# Patient Record
Sex: MALE | Race: WHITE | NOT HISPANIC OR LATINO | URBAN - METROPOLITAN AREA
[De-identification: names, ages, dates, MRNs, and addresses within clinical notes are randomized per-mention and may not be internally consistent; named-entity substitution may affect disease eponyms.]

---

## 2017-06-28 ENCOUNTER — OFFICE VISIT (OUTPATIENT)
Dept: CARDIOLOGY | Facility: CLINIC | Age: 52
End: 2017-06-28

## 2017-06-28 VITALS
HEIGHT: 73 IN | SYSTOLIC BLOOD PRESSURE: 137 MMHG | BODY MASS INDEX: 29.03 KG/M2 | WEIGHT: 219 LBS | HEART RATE: 68 BPM | DIASTOLIC BLOOD PRESSURE: 98 MMHG

## 2017-06-28 DIAGNOSIS — Z91.89 AT RISK FOR OBSTRUCTIVE SLEEP APNEA: ICD-10-CM

## 2017-06-28 DIAGNOSIS — I10 ESSENTIAL HYPERTENSION: ICD-10-CM

## 2017-06-28 DIAGNOSIS — G47.9 SLEEP DISTURBANCES: ICD-10-CM

## 2017-06-28 DIAGNOSIS — I25.10 CORONARY ARTERY DISEASE INVOLVING NATIVE CORONARY ARTERY OF NATIVE HEART WITHOUT ANGINA PECTORIS: Primary | ICD-10-CM

## 2017-06-28 DIAGNOSIS — Z72.0 TOBACCO USE: ICD-10-CM

## 2017-06-28 DIAGNOSIS — E78.5 HYPERLIPIDEMIA, UNSPECIFIED HYPERLIPIDEMIA TYPE: ICD-10-CM

## 2017-06-28 PROCEDURE — 99213 OFFICE O/P EST LOW 20 MIN: CPT | Performed by: INTERNAL MEDICINE

## 2017-06-28 NOTE — PROGRESS NOTES
Subjective:     Encounter Date:06/28/2017      Patient ID: Robin Whitlock is a 52 y.o. 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    REFERRING PHYSICIAN: Ward Arana MD    Chief Complaint:   Chief Complaint   Patient presents with   • Coronary Artery Disease     Problem List:  1. Chronic ischemic heart disease:  a. Remote apparent non ST elevation myocardial infarction with abnormal cardiac enzymes and diagnostic coronary arteriography with angioplasty and stent deployment x2 - data deficit, [place], December 2010.  b. Reported acceptable stress test - data deficit, August 2011.   c. Recurrent chest pain syndrome with diagnostic coronary arteriography and angioplasty and stent deployment - data deficit, September 2012.   d. Recurrent chest pain syndrome with acceptable Cardiolite GXT (100% PMHR; 89% of predicted exercise capacity) without ischemic EKG changes, marked ectopy, anginal type chest discomfort without myocardial scar/reversible ischemia with preserved LVEF (0.62) with continued medical therapy felt warranted, October 2014.   e. Residual class I symptoms.   2. Chronic hypertension - probable essential.   3. Dyslipidemia.   4. Chronic lower tract obstructive syndrome - probable BPH.  5. Intermittent depression.   6. Chronic tobacco use, discontinued December 2015 and restarted spring 2017.    Allergies   Allergen Reactions   • Sulfa Antibiotics Rash         Current Outpatient Prescriptions:   •  amLODIPine (NORVASC) 2.5 MG tablet, Take 1 tablet by mouth Daily., Disp: 90 tablet, Rfl: 3  •  aspirin 81 MG tablet, Take 81 mg by mouth daily., Disp: , Rfl:   •  cholecalciferol (VITAMIN D3) 1000 UNITS tablet, Take 2,000 Units by mouth daily., Disp: , Rfl:   •  CIALIS 5 MG tablet, TAKE 1 TABLET BY MOUTH DAILY (Patient taking differently: QOD), Disp: 30 tablet, Rfl: 5  •  clopidogrel (PLAVIX) 75 MG tablet, Take 1 tablet by mouth Daily., Disp: 90 tablet, Rfl:  3  •  ezetimibe (ZETIA) 10 MG tablet, Take 1 tablet by mouth Daily., Disp: 90 tablet, Rfl: 3  •  isosorbide mononitrate (IMDUR) 30 MG 24 hr tablet, Take 1 tablet by mouth Daily., Disp: 90 tablet, Rfl: 3  •  metoprolol tartrate (LOPRESSOR) 25 MG tablet, Take 1 tablet by mouth 2 (Two) Times a Day., Disp: 180 tablet, Rfl: 3  •  nitroglycerin (NITROSTAT) 0.4 MG SL tablet, Place 1 tablet under the tongue Every 5 (Five) Minutes As Needed for chest pain. Take no more than 3 doses in 15 minutes., Disp: 25 tablet, Rfl: 2  •  rosuvastatin (CRESTOR) 40 MG tablet, Take 1 tablet by mouth Daily., Disp: 90 tablet, Rfl: 3    History of Present Illness Patient is here for a 6-month followup. He just recently returned from his 3-month work trip to [place].  He says he did not really enjoy it because he does not like being in big cities; he spent a lot of his time in [place], [place] has 4 million people.  He is hoping he does not have to go back to [place] for his job; he still supports them remotely from here.  He is afraid to say that he started smoking again while he was in [place].  He does not really want to take Chantix because he has had friends who had unpleasant side effects to it.  He had actually lost most of the weight before his trip and actually returned from his trip 1 pound heavier than what he was when he left.  He does state that he wakes up frequently at night and is able to go back to sleep, and he is currently wearing a FitBit that measures how well he is sleeping.  He wonders if he is having sleep apnea after talking to some coworkers who have it. He has not had any heart issues; no chest pain, tightness, or pressure.  He does not have any symptoms from a cardiopulmonary perspective with his daily activities.  Patient otherwise denies chest pain, shortness of breath, PND, edema, palpitations, syncope or presyncope at this time.      Review of Systems   Endocrine: Positive for cold intolerance.     "  Obtained and otherwise negative except as outlined in problem list and HPI.    Procedures       Objective:       Vitals:    06/28/17 1317 06/28/17 1319   BP: 137/95 137/98   BP Location: Left arm Left arm   Patient Position: Sitting Standing   Pulse: 62 68   Weight: 219 lb (99.3 kg)    Height: 73\" (185.4 cm)      Body mass index is 28.89 kg/(m^2).   Last weight 238 pounds in December 2016    Physical Exam   Constitutional: He appears well-developed and well-nourished.   HENT:   Head: Normocephalic and atraumatic.   Mouth/Throat: Oropharynx is clear and moist.   Neck: Neck supple. No JVD present. Carotid bruit is not present. No thyromegaly present.   Cardiovascular: Normal rate and regular rhythm.  Exam reveals no gallop, no S3 and no friction rub.    No murmur heard.  Pulses:       Dorsalis pedis pulses are 2+ on the right side, and 2+ on the left side.        Posterior tibial pulses are 2+ on the right side, and 2+ on the left side.   Pulmonary/Chest: Effort normal and breath sounds normal.   Abdominal: Soft. He exhibits no mass. There is no hepatosplenomegaly. There is no tenderness.   Lymphadenopathy:     He has no cervical adenopathy.   Neurological: He is alert.   Skin: Skin is warm, dry and intact.       Lab Review:   Lab Results   Component Value Date    GLUCOSE 111 (H) 09/09/2016    BUN 15 09/09/2016    CREATININE 0.80 09/09/2016    EGFRIFNONA 103 09/09/2016    BCR 18.8 09/09/2016    CO2 33.0 (H) 09/09/2016    CALCIUM 9.8 09/09/2016    ALBUMIN 4.30 09/09/2016    LABIL2 1.5 09/09/2016    AST 53 (H) 09/09/2016    ALT 68 (H) 09/09/2016       Lab Results   Component Value Date    WBC 6.72 09/09/2016    HGB 14.3 09/09/2016    HCT 42.4 09/09/2016    MCV 96.1 09/09/2016     09/09/2016       Lab Results   Component Value Date    HGBA1C 5.20 09/09/2016       Lab Results   Component Value Date    TSH 2.749 09/09/2016       Lab Results   Component Value Date    CHOL 140 09/09/2016    CHOL 148 06/30/2016 "     Lab Results   Component Value Date    TRIG 174 (H) 09/09/2016    TRIG 156 (H) 06/30/2016    TRIG 214 (H) 09/16/2015     Lab Results   Component Value Date    HDL 44 09/09/2016    HDL 42 06/30/2016    HDL 55 09/16/2015           Assessment:     Overall continued acceptable course with no interim cardiopulmonary complaints with good functional status. We will defer additional diagnostic or therapeutic intervention from a cardiac perspective at this time.  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     Diagnosis Plan   1. Coronary artery disease involving native coronary artery of native heart without angina pectoris  He does not appear to have angina pectoris at this time; continue current treatment   2. Hyperlipidemia, unspecified hyperlipidemia type  He will follow up with Dr. Arana in 3 months for reassessment of lipid values   3. Essential hypertension  Controlled   4. Tobacco use  Cessation stressed; he declines Chantix   5.      At risk for obstructive sleep apnea       Plan:         1. Patient to continue current medications and close follow up with the above providers.  2. Overnight sleep oximetry study.  3. Tentative cardiology follow up in 6 months or patient may return sooner PRN.    Partially scribed for Florentino Frank MD by Crystal Vieira, YAKELIN. 6/28/2017  1:22 PM     Transcribed by Cyndie Eubanks for Dr. Florentino Frank at 1:23 PM on 06/28/2017    I, Florentino Frank MD, [facility], personally performed the services described in this documentation as scribed by the above named individual in my presence, and it is both accurate and complete. At 1:40 PM on 06/28/2017

## 2017-09-14 ENCOUNTER — OFFICE VISIT (OUTPATIENT)
Dept: FAMILY MEDICINE CLINIC | Facility: CLINIC | Age: 52
End: 2017-09-14

## 2017-09-14 VITALS
HEIGHT: 73 IN | BODY MASS INDEX: 29.16 KG/M2 | WEIGHT: 220 LBS | DIASTOLIC BLOOD PRESSURE: 82 MMHG | HEART RATE: 58 BPM | OXYGEN SATURATION: 98 % | RESPIRATION RATE: 16 BRPM | SYSTOLIC BLOOD PRESSURE: 124 MMHG

## 2017-09-14 DIAGNOSIS — Z00.00 HEALTHCARE MAINTENANCE: Primary | ICD-10-CM

## 2017-09-14 DIAGNOSIS — G47.33 OSA (OBSTRUCTIVE SLEEP APNEA): ICD-10-CM

## 2017-09-14 DIAGNOSIS — Z87.891 PERSONAL HISTORY OF TOBACCO USE, PRESENTING HAZARDS TO HEALTH: ICD-10-CM

## 2017-09-14 PROBLEM — Z91.89 AT RISK FOR OBSTRUCTIVE SLEEP APNEA: Status: RESOLVED | Noted: 2017-06-28 | Resolved: 2017-09-14

## 2017-09-14 LAB
25(OH)D3 SERPL-MCNC: 36.8 NG/ML
ALBUMIN SERPL-MCNC: 4.3 G/DL (ref 3.2–4.8)
ALBUMIN/GLOB SERPL: 1.5 G/DL (ref 1.5–2.5)
ALP SERPL-CCNC: 83 U/L (ref 25–100)
ALT SERPL W P-5'-P-CCNC: 41 U/L (ref 7–40)
ANION GAP SERPL CALCULATED.3IONS-SCNC: 10 MMOL/L (ref 3–11)
ARTICHOKE IGE QN: 91 MG/DL (ref 0–130)
AST SERPL-CCNC: 35 U/L (ref 0–33)
BASOPHILS # BLD AUTO: 0.08 10*3/MM3 (ref 0–0.2)
BASOPHILS NFR BLD AUTO: 1.2 % (ref 0–1)
BILIRUB BLD-MCNC: NEGATIVE MG/DL
BILIRUB SERPL-MCNC: 0.6 MG/DL (ref 0.3–1.2)
BUN BLD-MCNC: 14 MG/DL (ref 9–23)
BUN/CREAT SERPL: 17.5 (ref 7–25)
CALCIUM SPEC-SCNC: 9.1 MG/DL (ref 8.7–10.4)
CHLORIDE SERPL-SCNC: 106 MMOL/L (ref 99–109)
CHOLEST SERPL-MCNC: 150 MG/DL (ref 0–200)
CLARITY, POC: CLEAR
CO2 SERPL-SCNC: 24 MMOL/L (ref 20–31)
COLOR UR: YELLOW
CREAT BLD-MCNC: 0.8 MG/DL (ref 0.6–1.3)
CRP SERPL-MCNC: 0.08 MG/DL (ref 0–1)
DEPRECATED RDW RBC AUTO: 43.3 FL (ref 37–54)
EOSINOPHIL # BLD AUTO: 0.28 10*3/MM3 (ref 0–0.3)
EOSINOPHIL NFR BLD AUTO: 4.4 % (ref 0–3)
ERYTHROCYTE [DISTWIDTH] IN BLOOD BY AUTOMATED COUNT: 12.3 % (ref 11.3–14.5)
GFR SERPL CREATININE-BSD FRML MDRD: 102 ML/MIN/1.73
GLOBULIN UR ELPH-MCNC: 2.9 GM/DL
GLUCOSE BLD-MCNC: 102 MG/DL (ref 70–100)
GLUCOSE UR STRIP-MCNC: NEGATIVE MG/DL
HBA1C MFR BLD: 5.2 % (ref 4.8–5.6)
HCT VFR BLD AUTO: 43.1 % (ref 38.9–50.9)
HCV AB SER DONR QL: NORMAL
HDLC SERPL-MCNC: 43 MG/DL (ref 40–60)
HGB BLD-MCNC: 14.4 G/DL (ref 13.1–17.5)
HIV1+2 AB SER QL: NORMAL
IMM GRANULOCYTES # BLD: 0.01 10*3/MM3 (ref 0–0.03)
IMM GRANULOCYTES NFR BLD: 0.2 % (ref 0–0.6)
KETONES UR QL: NEGATIVE
LEUKOCYTE EST, POC: NEGATIVE
LYMPHOCYTES # BLD AUTO: 1.75 10*3/MM3 (ref 0.6–4.8)
LYMPHOCYTES NFR BLD AUTO: 27.2 % (ref 24–44)
MCH RBC QN AUTO: 32.2 PG (ref 27–31)
MCHC RBC AUTO-ENTMCNC: 33.4 G/DL (ref 32–36)
MCV RBC AUTO: 96.4 FL (ref 80–99)
MONOCYTES # BLD AUTO: 0.82 10*3/MM3 (ref 0–1)
MONOCYTES NFR BLD AUTO: 12.8 % (ref 0–12)
NEUTROPHILS # BLD AUTO: 3.49 10*3/MM3 (ref 1.5–8.3)
NEUTROPHILS NFR BLD AUTO: 54.2 % (ref 41–71)
NITRITE UR-MCNC: NEGATIVE MG/ML
PH UR: 5.5 [PH] (ref 5–8)
PLATELET # BLD AUTO: 150 10*3/MM3 (ref 150–450)
PMV BLD AUTO: 11.7 FL (ref 6–12)
POTASSIUM BLD-SCNC: 4.1 MMOL/L (ref 3.5–5.5)
PROT SERPL-MCNC: 7.2 G/DL (ref 5.7–8.2)
PROT UR STRIP-MCNC: NEGATIVE MG/DL
PSA SERPL-MCNC: 0.41 NG/ML (ref 0–4)
RBC # BLD AUTO: 4.47 10*6/MM3 (ref 4.2–5.76)
RBC # UR STRIP: NEGATIVE /UL
SODIUM BLD-SCNC: 140 MMOL/L (ref 132–146)
SP GR UR: 1.01 (ref 1–1.03)
TRIGL SERPL-MCNC: 171 MG/DL (ref 0–150)
TSH SERPL DL<=0.05 MIU/L-ACNC: 2.16 MIU/ML (ref 0.35–5.35)
URATE SERPL-MCNC: 5.6 MG/DL (ref 3.7–9.2)
UROBILINOGEN UR QL: NORMAL
WBC NRBC COR # BLD: 6.43 10*3/MM3 (ref 3.5–10.8)

## 2017-09-14 PROCEDURE — 83036 HEMOGLOBIN GLYCOSYLATED A1C: CPT | Performed by: FAMILY MEDICINE

## 2017-09-14 PROCEDURE — 99406 BEHAV CHNG SMOKING 3-10 MIN: CPT | Performed by: FAMILY MEDICINE

## 2017-09-14 PROCEDURE — 80061 LIPID PANEL: CPT | Performed by: FAMILY MEDICINE

## 2017-09-14 PROCEDURE — 86803 HEPATITIS C AB TEST: CPT | Performed by: FAMILY MEDICINE

## 2017-09-14 PROCEDURE — 84443 ASSAY THYROID STIM HORMONE: CPT | Performed by: FAMILY MEDICINE

## 2017-09-14 PROCEDURE — 86140 C-REACTIVE PROTEIN: CPT | Performed by: FAMILY MEDICINE

## 2017-09-14 PROCEDURE — 36415 COLL VENOUS BLD VENIPUNCTURE: CPT | Performed by: FAMILY MEDICINE

## 2017-09-14 PROCEDURE — 81003 URINALYSIS AUTO W/O SCOPE: CPT | Performed by: FAMILY MEDICINE

## 2017-09-14 PROCEDURE — 90715 TDAP VACCINE 7 YRS/> IM: CPT | Performed by: FAMILY MEDICINE

## 2017-09-14 PROCEDURE — 90670 PCV13 VACCINE IM: CPT | Performed by: FAMILY MEDICINE

## 2017-09-14 PROCEDURE — 84550 ASSAY OF BLOOD/URIC ACID: CPT | Performed by: FAMILY MEDICINE

## 2017-09-14 PROCEDURE — 80053 COMPREHEN METABOLIC PANEL: CPT | Performed by: FAMILY MEDICINE

## 2017-09-14 PROCEDURE — 90471 IMMUNIZATION ADMIN: CPT | Performed by: FAMILY MEDICINE

## 2017-09-14 PROCEDURE — 84153 ASSAY OF PSA TOTAL: CPT | Performed by: FAMILY MEDICINE

## 2017-09-14 PROCEDURE — 85025 COMPLETE CBC W/AUTO DIFF WBC: CPT | Performed by: FAMILY MEDICINE

## 2017-09-14 PROCEDURE — 99396 PREV VISIT EST AGE 40-64: CPT | Performed by: FAMILY MEDICINE

## 2017-09-14 PROCEDURE — G0432 EIA HIV-1/HIV-2 SCREEN: HCPCS | Performed by: FAMILY MEDICINE

## 2017-09-14 PROCEDURE — 90472 IMMUNIZATION ADMIN EACH ADD: CPT | Performed by: FAMILY MEDICINE

## 2017-09-14 PROCEDURE — 82306 VITAMIN D 25 HYDROXY: CPT | Performed by: FAMILY MEDICINE

## 2017-09-14 RX ORDER — NICOTINE 21 MG/24HR
1 PATCH, TRANSDERMAL 24 HOURS TRANSDERMAL EVERY 24 HOURS
Start: 2017-09-14 | End: 2018-01-10

## 2017-09-14 NOTE — PROGRESS NOTES
Subjective   Robin Whitlock is a 52 y.o. male.     History of Present Illness   He is here for his annual fasting wellness evaluation.  He is amendable to updating his vaccines.  He continues to smoke cigarettes.  He is requesting a sleep medicine referral with recent findings on his overnight oxygen analysis ordered by his cardiologist.    Review of Systems   Constitutional: Positive for fatigue.   HENT: Negative.    Eyes: Negative.    Respiratory: Positive for apnea.    Cardiovascular: Negative.    Gastrointestinal: Negative.    Endocrine: Negative.    Genitourinary: Negative.    Musculoskeletal: Negative.    Skin: Negative.    Allergic/Immunologic: Negative.    Neurological: Negative.    Hematological: Negative.    Psychiatric/Behavioral: Negative.    All other systems reviewed and are negative.    Objective   Physical Exam   Constitutional: He is oriented to person, place, and time. He appears well-developed and well-nourished. He is cooperative.   HENT:   Head: Normocephalic and atraumatic.   Right Ear: Hearing and external ear normal.   Left Ear: Hearing and external ear normal.   Nose: Nose normal.   Mouth/Throat: Uvula is midline, oropharynx is clear and moist and mucous membranes are normal.   Eyes: Conjunctivae and EOM are normal. Pupils are equal, round, and reactive to light. No scleral icterus.   Neck: Trachea normal and normal range of motion. Neck supple. No JVD present. Carotid bruit is not present. No thyromegaly present.   Cardiovascular: Normal rate, regular rhythm, normal heart sounds and intact distal pulses.    Pulmonary/Chest: Effort normal and breath sounds normal.   Abdominal: Soft. Bowel sounds are normal. There is no hepatosplenomegaly. There is no tenderness.   Musculoskeletal: Normal range of motion.   Lymphadenopathy:     He has no cervical adenopathy.   Neurological: He is alert and oriented to person, place, and time. He has normal strength and normal reflexes. No sensory deficit.  Gait normal.   Skin: Skin is warm and dry.   Psychiatric: He has a normal mood and affect. His speech is normal and behavior is normal. Judgment and thought content normal. Cognition and memory are normal.   Nursing note and vitals reviewed.      Assessment/Plan   Diagnoses and all orders for this visit:    Healthcare maintenance  -     POC Urinalysis Dipstick, Automated  -     Comprehensive Metabolic Panel  -     CBC & Differential  -     Lipid Panel  -     TSH  -     Uric Acid  -     C-reactive Protein  -     PSA  -     HIV-1 / O / 2 Ag / Antibody 4th Generation  -     Hepatitis C Antibody  -     Hemoglobin A1c  -     Vitamin D 25 Hydroxy  -     Ambulatory Referral For Screening Colonoscopy  -     Tdap Vaccine Greater Than or Equal To 6yo IM  -     Pneumococcal Conjugate Vaccine 13-Valent All  - Vaccine counseling and current VIS is provided for immunizations administered today.    [place] (obstructive sleep apnea)  -     Ambulatory Referral to Sleep Medicine    Personal history of tobacco use, presenting hazards to health  -     Pneumococcal Conjugate Vaccine 13-Valent All  -     nicotine (EQ NICOTINE) 21 MG/24HR patch; Place 1 patch on the skin Daily.  -     Ambulatory Referral to Smoking Cessation Program  - Tobacco cessation counseling lasting greater than 3 minutes but no more than 10 minutes is provided today.  We discussed various smoking cessation programs including Trace Mahan, Commit to Quit and Kick It.  We discussed over the counter nicotine replacement products.  We discussed the benefits of cessation.  We discussed the risks of ongoing use including but not limited to lung disease, cancer and death.  The patient is encouraged to discontinue smoking and treatment options have been discussed & offered.    The patient is here for a health maintenance visit.  Currently, the patient consumes a calorie enriched diet and has an adequate exercise regimen. Screening lab work is ordered.  Immunizations are  updated today.  Advice and education is given regarding nutrition, aerobic exercise, routine dental evaluations, routine eye exams, reproductive health, cardiovascular risk reduction, sunscreen use, self skin examination (annual dermatology evaluations) and seat belt use (general overall safety).  Further recommendations after lab evaluation.  Annual wellness evaluations recommended.

## 2017-10-31 DIAGNOSIS — N40.1 BENIGN NON-NODULAR PROSTATIC HYPERPLASIA WITH LOWER URINARY TRACT SYMPTOMS: ICD-10-CM

## 2017-10-31 RX ORDER — TADALAFIL 5 MG
TABLET ORAL
Qty: 30 TABLET | Refills: 5 | Status: SHIPPED | OUTPATIENT
Start: 2017-10-31 | End: 2018-10-11 | Stop reason: SDUPTHER

## 2018-01-03 RX ORDER — ISOSORBIDE MONONITRATE 30 MG/1
TABLET, EXTENDED RELEASE ORAL
Qty: 90 TABLET | Refills: 3 | Status: SHIPPED | OUTPATIENT
Start: 2018-01-03 | End: 2018-12-04 | Stop reason: SDUPTHER

## 2018-01-03 RX ORDER — EZETIMIBE 10 MG/1
TABLET ORAL
Qty: 90 TABLET | Refills: 3 | Status: SHIPPED | OUTPATIENT
Start: 2018-01-03 | End: 2018-12-04 | Stop reason: SDUPTHER

## 2018-01-03 RX ORDER — AMLODIPINE BESYLATE 2.5 MG/1
TABLET ORAL
Qty: 90 TABLET | Refills: 3 | Status: SHIPPED | OUTPATIENT
Start: 2018-01-03 | End: 2018-12-04 | Stop reason: SDUPTHER

## 2018-01-04 RX ORDER — ROSUVASTATIN CALCIUM 40 MG/1
TABLET, COATED ORAL
Qty: 90 TABLET | Refills: 1 | Status: SHIPPED | OUTPATIENT
Start: 2018-01-04 | End: 2018-07-07 | Stop reason: SDUPTHER

## 2018-01-04 RX ORDER — CLOPIDOGREL BISULFATE 75 MG/1
TABLET ORAL
Qty: 90 TABLET | Refills: 1 | Status: SHIPPED | OUTPATIENT
Start: 2018-01-04 | End: 2018-07-07 | Stop reason: SDUPTHER

## 2018-01-10 ENCOUNTER — OFFICE VISIT (OUTPATIENT)
Dept: CARDIOLOGY | Facility: CLINIC | Age: 53
End: 2018-01-10

## 2018-01-10 VITALS
SYSTOLIC BLOOD PRESSURE: 119 MMHG | HEART RATE: 65 BPM | HEIGHT: 73 IN | WEIGHT: 232 LBS | BODY MASS INDEX: 30.75 KG/M2 | DIASTOLIC BLOOD PRESSURE: 88 MMHG

## 2018-01-10 DIAGNOSIS — I25.9 ISCHEMIC HEART DISEASE: ICD-10-CM

## 2018-01-10 DIAGNOSIS — I10 ESSENTIAL HYPERTENSION: Primary | ICD-10-CM

## 2018-01-10 DIAGNOSIS — E78.5 DYSLIPIDEMIA: ICD-10-CM

## 2018-01-10 DIAGNOSIS — Z72.0 TOBACCO ABUSE: ICD-10-CM

## 2018-01-10 PROCEDURE — 99214 OFFICE O/P EST MOD 30 MIN: CPT | Performed by: INTERNAL MEDICINE

## 2018-01-10 NOTE — PROGRESS NOTES
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     REFERRING PHYSICIAN: Ward Arana MD  SLEEP PHYSICIAN: Robin Acosta MD    Chief Complaint:   Chief Complaint   Patient presents with   • Coronary Artery Disease     Problem List:  1. Chronic ischemic heart disease:  a. Remote apparent non ST elevation myocardial infarction with abnormal cardiac enzymes and diagnostic coronary arteriography with angioplasty and stent deployment x2 - data deficit, [place], December 2010.  b. Reported acceptable stress test - data deficit, August 2011.   c. Recurrent chest pain syndrome with diagnostic coronary arteriography and angioplasty and stent deployment - data deficit, September 2012.   d. Recurrent chest pain syndrome with acceptable Cardiolite GXT (100% PMHR; 89% of predicted exercise capacity) without ischemic EKG changes, marked ectopy, anginal type chest discomfort without myocardial scar/reversible ischemia with preserved LVEF (0.62) with continued medical therapy felt warranted, October 2014.   e. Residual class I symptoms.   2. Chronic hypertension - probable essential.   3. Dyslipidemia.   4. Chronic lower tract obstructive syndrome - probable BPH.  5. Intermittent depression.   6. Chronic tobacco use, discontinued December 2015 and restarted spring 2017, Discontinued 10/27/17.  7. At risk for sleep apnea with sleep study scheduled January 2018     Allergies   Allergen Reactions   • Sulfa Antibiotics Rash         Current Outpatient Prescriptions:   •  amLODIPine (NORVASC) 2.5 MG tablet, TAKE 1 TABLET BY MOUTH  DAILY, Disp: 90 tablet, Rfl: 3  •  aspirin 81 MG tablet, Take 81 mg by mouth daily., Disp: , Rfl:   •  cholecalciferol (VITAMIN D3) 1000 UNITS tablet, Take 2,000 Units by mouth daily., Disp: , Rfl:   •  CIALIS 5 MG tablet, TAKE 1 TABLET BY MOUTH DAILY, Disp: 30 tablet, Rfl:  "5  •  clopidogrel (PLAVIX) 75 MG tablet, TAKE 1 TABLET BY MOUTH  DAILY, Disp: 90 tablet, Rfl: 1  •  ezetimibe (ZETIA) 10 MG tablet, TAKE 1 TABLET BY MOUTH  DAILY, Disp: 90 tablet, Rfl: 3  •  isosorbide mononitrate (IMDUR) 30 MG 24 hr tablet, TAKE 1 TABLET BY MOUTH  DAILY, Disp: 90 tablet, Rfl: 3  •  metoprolol tartrate (LOPRESSOR) 25 MG tablet, TAKE 1 TABLET BY MOUTH  TWICE A DAY, Disp: 180 tablet, Rfl: 1  •  nitroglycerin (NITROSTAT) 0.4 MG SL tablet, Place 1 tablet under the tongue Every 5 (Five) Minutes As Needed for chest pain. Take no more than 3 doses in 15 minutes., Disp: 25 tablet, Rfl: 2  •  rosuvastatin (CRESTOR) 40 MG tablet, TAKE 1 TABLET BY MOUTH  DAILY, Disp: 90 tablet, Rfl: 1    HISTORY OF PRESENT ILLNESS: Patient returns for scheduled followup after a 6-1/2 month hiatus.  Patient denies any chest pressure, palpitations, presyncope, syncope, edema, or shortness of breath.  He quit smoking 10/27/17.  He did not use any patches or nicotine gum.  He is having four crowns placed tomorrow and states that he is going to go without narcotics for these.  He is active traveling, and headed to [place] soon.  He is scheduled to see Dr. Acosta for a sleep study on 1/12/18.  Patient otherwise denies chest pain, shortness of breath, PND, edema, palpitations, syncope or presyncope at this time.  He is scheduled for reassessment of his work visa in spring 2018.        Review of Systems   Endocrine: Positive for cold intolerance.      Obtained and otherwise negative except as outlined in problem list and HPI.    Procedures       Objective:       Vitals:    01/10/18 1530 01/10/18 1531   BP: 129/92 119/88   BP Location: Left arm Left arm   Patient Position: Sitting Standing   Pulse: 63 65   Weight: 105 kg (232 lb)    Height: 185.4 cm (73\")      Body mass index is 30.61 kg/(m^2).   Last weight:  219 lbs.    Physical Exam   Constitutional: He is oriented to person, place, and time. He appears well-developed and " well-nourished.   Neck: No JVD present. Carotid bruit is not present. No thyromegaly present.   Cardiovascular: Regular rhythm, S1 normal, S2 normal and normal heart sounds.  Exam reveals no gallop, no S3 and no friction rub.    No murmur heard.  Pulses:       Dorsalis pedis pulses are 2+ on the right side, and 2+ on the left side.        Posterior tibial pulses are 2+ on the right side, and 2+ on the left side.   Pulmonary/Chest: Effort normal and breath sounds normal. He has no wheezes. He has no rhonchi. He has no rales.   Abdominal: Soft. He exhibits no mass. There is no hepatosplenomegaly. There is no tenderness. There is no guarding.   Bowel sounds audible x4   Musculoskeletal: Normal range of motion. He exhibits no edema.   Lymphadenopathy:     He has no cervical adenopathy.   Neurological: He is alert and oriented to person, place, and time.   Skin: Skin is warm, dry and intact. No rash noted.   Vitals reviewed.        Lab Review:   Lab Results   Component Value Date    GLUCOSE 102 (H) 09/14/2017    BUN 14 09/14/2017    CREATININE 0.80 09/14/2017    EGFRIFNONA 102 09/14/2017    BCR 17.5 09/14/2017    CO2 24.0 09/14/2017    CALCIUM 9.1 09/14/2017    ALBUMIN 4.30 09/14/2017    LABIL2 1.5 09/14/2017    AST 35 (H) 09/14/2017    ALT 41 (H) 09/14/2017   Sodium - 140  Potassium - 4.1  Chloride - 106    Lab Results   Component Value Date    WBC 6.43 09/14/2017    HGB 14.4 09/14/2017    HCT 43.1 09/14/2017    MCV 96.4 09/14/2017     09/14/2017       Lab Results   Component Value Date    HGBA1C 5.20 09/14/2017       Lab Results   Component Value Date    TSH 2.164 09/14/2017       Lab Results   Component Value Date    CHOL 150 09/14/2017    CHOL 140 09/09/2016     Lab Results   Component Value Date    TRIG 171 (H) 09/14/2017    TRIG 174 (H) 09/09/2016     Lab Results   Component Value Date    HDL 43 09/14/2017    HDL 44 09/09/2016   LDL - 91 (09/14/2017)    09/14/2017:  · Vitamin D - 36.8   · Hepatitis C  antibody - nonreactive  · HIV-1/HIV-2 - nonreactive  · PSA - 0.410  · C-reactive protein - 0.08  · Uric acid - 5.6          Assessment:   Overall continued acceptable course with no interim cardiopulmonary complaints with good functional status. We will defer additional diagnostic or therapeutic intervention from a cardiac perspective at this time. We are pleased that he has been able to stop smoking since October 2017.  He has concerns with his visa and is not sure how that process will work later this spring when he has to be renewed and hopes he is able to keep seeing us.  He is scheduled to see Dr. Acosta for a sleep study this week.       Diagnosis Plan   1. Essential hypertension  Controlled    2. Ischemic heart disease  Stable; No recurrent angina pectoris or CHF.     3. Dyslipidemia  Controlled    4. Tobacco abuse  Cessation since October 2017           Plan:         1. Patient to continue current medications and close follow up with the above providers.  2. Tentative cardiology follow up in July 2018, or patient may return sooner PRN.    Scribed for Florentino Frank MD by Crystal Vieira, APRN. 1/10/2018  3:48 PM    IFlorentino MD, [facility], personally performed the services described in this documentation as scribed by the above named individual in my presence, and it is both accurate and complete. At 4:10 PM on 01/10/2018

## 2018-01-12 ENCOUNTER — CONSULT (OUTPATIENT)
Dept: SLEEP MEDICINE | Facility: HOSPITAL | Age: 53
End: 2018-01-12

## 2018-01-12 VITALS
HEIGHT: 73 IN | DIASTOLIC BLOOD PRESSURE: 74 MMHG | WEIGHT: 227.07 LBS | BODY MASS INDEX: 30.09 KG/M2 | SYSTOLIC BLOOD PRESSURE: 112 MMHG | HEART RATE: 56 BPM | OXYGEN SATURATION: 94 %

## 2018-01-12 DIAGNOSIS — G47.33 OBSTRUCTIVE SLEEP APNEA, ADULT: ICD-10-CM

## 2018-01-12 DIAGNOSIS — R06.83 SNORING: Primary | ICD-10-CM

## 2018-01-12 DIAGNOSIS — E66.09 CLASS 1 OBESITY DUE TO EXCESS CALORIES WITHOUT SERIOUS COMORBIDITY WITH BODY MASS INDEX (BMI) OF 30.0 TO 30.9 IN ADULT: ICD-10-CM

## 2018-01-12 PROCEDURE — 99204 OFFICE O/P NEW MOD 45 MIN: CPT | Performed by: INTERNAL MEDICINE

## 2018-01-12 NOTE — PROGRESS NOTES
Subjective   Robin Whitlock is a 52 y.o. male is being seen for consultation today at the request of Ward Arana MD for the evaluation of snoring and nonrestorative sleep.  He is also referred by Dr. Frank.    History of Present Illness  Patient states she's had a cardiac history for several years.  He was diagnosed with coronary artery disease in 2010 and had stents placed and then had stents again placed 2012.  He's had occasional palpitations.  He complains of frequent awakenings at night.  He says he awakens 2-3 times per night and is restless.  He had oximetry performed earlier which showed significant nocturnal hypoxemia.  He is referred for further evaluation.    The patient states that he is rarely been noted to have snoring but says it is worse on his back.  He says he's had this as long as he can remember.  He denies awakening gasping for breath.  He has occasional awakened choking.  He says he's often not rested in the morning but denies having morning headaches.  He occasionally has a dry mouth.  He denies falling asleep if sitting quietly during the day.  He denies ever breaking his nose.  He denies any reflux symptoms he denies hypnagogic hallucinations sleep paralysis or cataplexy.  He denies kicking or jerking his legs at night.  He denies chronic pain.    He'll go to bed about 11:30 at night.  He will fall asleep in 20-60 minutes.  He awakens about 3 times during the night.  He thinks he gets 6-8 hours of sleep but still feels sluggish.  He's had hypertension known since 2010.  He's had coronary artery disease known since 2010.  He denies any history of diabetes.  Allergies   Allergen Reactions   • Sulfa Antibiotics Rash    He is also allergic to wasp and hornet stings      Current Outpatient Prescriptions:   •  amLODIPine (NORVASC) 2.5 MG tablet, TAKE 1 TABLET BY MOUTH  DAILY, Disp: 90 tablet, Rfl: 3  •  aspirin 81 MG tablet, Take 81 mg by mouth daily., Disp: , Rfl:   •  cholecalciferol  (VITAMIN D3) 1000 UNITS tablet, Take 2,000 Units by mouth daily., Disp: , Rfl:   •  CIALIS 5 MG tablet, TAKE 1 TABLET BY MOUTH DAILY, Disp: 30 tablet, Rfl: 5  •  clopidogrel (PLAVIX) 75 MG tablet, TAKE 1 TABLET BY MOUTH  DAILY, Disp: 90 tablet, Rfl: 1  •  ezetimibe (ZETIA) 10 MG tablet, TAKE 1 TABLET BY MOUTH  DAILY, Disp: 90 tablet, Rfl: 3  •  isosorbide mononitrate (IMDUR) 30 MG 24 hr tablet, TAKE 1 TABLET BY MOUTH  DAILY, Disp: 90 tablet, Rfl: 3  •  metoprolol tartrate (LOPRESSOR) 25 MG tablet, TAKE 1 TABLET BY MOUTH  TWICE A DAY, Disp: 180 tablet, Rfl: 1  •  nitroglycerin (NITROSTAT) 0.4 MG SL tablet, Place 1 tablet under the tongue Every 5 (Five) Minutes As Needed for chest pain. Take no more than 3 doses in 15 minutes., Disp: 25 tablet, Rfl: 2  •  rosuvastatin (CRESTOR) 40 MG tablet, TAKE 1 TABLET BY MOUTH  DAILY, Disp: 90 tablet, Rfl: 1    Smoking status: Former Smoker                                                              Packs/day: 0.50      Years: 27.00        Types: Cigarettes     Quit date: 10/27/2017  Smokeless status: Never Used                           History   Alcohol Use   • He states he has up to 6 beers or 3-4 mixed drinks per day.     •        Caffeine: He has 2 cups coffee per day    Past Medical History:   Diagnosis Date   • BPH (benign prostatic hypertrophy)    • CAD (coronary artery disease)    • Chronic ischemic heart disease    • Cystic acne    • ED (erectile dysfunction) of non-organic origin    • Fracture of humerus 2013   • History of myocardial infarction    • Hyperlipidemia    • Hypertension    • [place] (obstructive sleep apnea)    • Tobacco dependence        Past Surgical History:   Procedure Laterality Date   • CARDIAC CATHETERIZATION  2012   • CORONARY STENT PLACEMENT  2012       Family History   Problem Relation Age of Onset   • Heart attack Father    • Bone cancer Father    • Multiple sclerosis Father    • Heart disease Mother    Family history is also positive for lung  "cancer and renal cancer and multiple sclerosis    The following portions of the patient's history were reviewed and updated as appropriate: allergies, current medications, past family history, past medical history, past social history, past surgical history and problem list.    Review of Systems   Constitutional: Positive for unexpected weight change.   HENT: Negative.    Eyes: Negative.    Respiratory: Negative.    Cardiovascular: Negative.    Gastrointestinal: Negative.    Endocrine: Positive for cold intolerance and heat intolerance.   Genitourinary: Negative.    Musculoskeletal: Negative.    Skin: Negative.    Allergic/Immunologic: Negative.    Hematological: Negative.    Psychiatric/Behavioral: Negative.     [place] scores 9/24    Objective     /74  Pulse 56  Ht 185.4 cm (72.99\")  Wt 103 kg (227 lb 1.2 oz)  SpO2 94%  BMI 29.97 kg/m2     Physical Exam   Constitutional: He is oriented to person, place, and time. He appears well-developed and well-nourished.   He is obese.   HENT:   Head: Normocephalic and atraumatic.   He has Mallampati class III anatomy and moderate retrognathia   Eyes: EOM are normal. Pupils are equal, round, and reactive to light.   Neck: Normal range of motion. Neck supple.   Cardiovascular: Normal rate, regular rhythm and normal heart sounds.    Pulmonary/Chest: Effort normal and breath sounds normal.   Abdominal: Soft. Bowel sounds are normal.   Musculoskeletal: Normal range of motion. He exhibits no edema.   Neurological: He is alert and oriented to person, place, and time.   Skin: Skin is warm and dry.   Psychiatric: He has a normal mood and affect. His behavior is normal.         Assessment/Plan   Robin was seen today for sleeping problem.    Diagnoses and all orders for this visit:    Snoring  -     Home Sleep Study; Future    Obstructive sleep apnea, adult  -     Home Sleep Study; Future    Class 1 obesity due to excess calories without serious comorbidity with body mass " index (BMI) of 30.0 to 30.9 in adult     patient has a history of snoring and disturbed sleep.  I think he gets an excellent history for obstructive sleep apnea.  We'll plan to proceed to home sleep testing.  We've discussed possible therapies including CPAP, weight weight control, oral appliances, and surgery.  We've also discussed the possible complications of long-term untreated obstructive sleep apnea.  He is to return in roughly 2 weeks after his study and we'll reassess situation.  He is encouraged to lose weight.  He is encouraged to avoid alcohol and sedatives close to bedtime.  He is encouraged practice lateral position sleep.         Robin Acosta MD [facility]  Sleep Medicine  Pulmonary and Critical Care Medicine

## 2018-01-12 NOTE — PATIENT INSTRUCTIONS
Sleep Apnea  Sleep apnea is a condition in which breathing pauses or becomes shallow during sleep. Episodes of sleep apnea usually last 10 seconds or longer, and they may occur as many as 20 times an hour. Sleep apnea disrupts your sleep and keeps your body from getting the rest that it needs. This condition can increase your risk of certain health problems, including:  · Heart attack.  · Stroke.  · Obesity.  · Diabetes.  · Heart failure.  · Irregular heartbeat.  There are three kinds of sleep apnea:  · Obstructive sleep apnea. This kind is caused by a blocked or collapsed airway.  · Central sleep apnea. This kind happens when the part of the brain that controls breathing does not send the correct signals to the muscles that control breathing.  · Mixed sleep apnea. This is a combination of obstructive and central sleep apnea.  What are the causes?  The most common cause of this condition is a collapsed or blocked airway. An airway can collapse or become blocked if:  · Your throat muscles are abnormally relaxed.  · Your tongue and tonsils are larger than normal.  · You are overweight.  · Your airway is smaller than normal.  What increases the risk?  This condition is more likely to develop in people who:  · Are overweight.  · Smoke.  · Have a smaller than normal airway.  · Are elderly.  · Are male.  · Drink alcohol.  · Take sedatives or tranquilizers.  · Have a family history of sleep apnea.  What are the signs or symptoms?  Symptoms of this condition include:  · Trouble staying asleep.  · Daytime sleepiness and tiredness.  · Irritability.  · Loud snoring.  · Morning headaches.  · Trouble concentrating.  · Forgetfulness.  · Decreased interest in sex.  · Unexplained sleepiness.  · Mood swings.  · Personality changes.  · Feelings of depression.  · Waking up often during the night to urinate.  · Dry mouth.  · Sore throat.  How is this diagnosed?  This condition may be diagnosed with:  · A medical history.  · A physical  exam.  · A series of tests that are done while you are sleeping (sleep study). These tests are usually done in a sleep lab, but they may also be done at home.  How is this treated?  Treatment for this condition aims to restore normal breathing and to ease symptoms during sleep. It may involve managing health issues that can affect breathing, such as high blood pressure or obesity. Treatment may include:  · Sleeping on your side.  · Using a decongestant if you have nasal congestion.  · Avoiding the use of depressants, including alcohol, sedatives, and narcotics.  · Losing weight if you are overweight.  · Making changes to your diet.  · Quitting smoking.  · Using a device to open your airway while you sleep, such as:  ¨ An oral appliance. This is a custom-made mouthpiece that shifts your lower jaw forward.  ¨ A continuous positive airway pressure (CPAP) device. This device delivers oxygen to your airway through a mask.  ¨ A nasal expiratory positive airway pressure (EPAP) device. This device has valves that you put into each nostril.  ¨ A bi-level positive airway pressure (BPAP) device. This device delivers oxygen to your airway through a mask.  · Surgery if other treatments do not work. During surgery, excess tissue is removed to create a wider airway.  It is important to get treatment for sleep apnea. Without treatment, this condition can lead to:  · High blood pressure.  · Coronary artery disease.  · (Men) An inability to achieve or maintain an erection (impotence).  · Reduced thinking abilities.  Follow these instructions at home:  · Make any lifestyle changes that your health care provider recommends.  · Eat a healthy, well-balanced diet.  · Take over-the-counter and prescription medicines only as told by your health care provider.  · Avoid using depressants, including alcohol, sedatives, and narcotics.  · Take steps to lose weight if you are overweight.  · If you were given a device to open your airway while you  sleep, use it only as told by your health care provider.  · Do not use any tobacco products, such as cigarettes, chewing tobacco, and e-cigarettes. If you need help quitting, ask your health care provider.  · Keep all follow-up visits as told by your health care provider. This is important.  Contact a health care provider if:  · The device that you received to open your airway during sleep is uncomfortable or does not seem to be working.  · Your symptoms do not improve.  · Your symptoms get worse.  Get help right away if:  · You develop chest pain.  · You develop shortness of breath.  · You develop discomfort in your back, arms, or stomach.  · You have trouble speaking.  · You have weakness on one side of your body.  · You have drooping in your face.  These symptoms may represent a serious problem that is an emergency. Do not wait to see if the symptoms will go away. Get medical help right away. Call your local emergency services (911 in the [place] Do not drive yourself to the hospital.   This information is not intended to replace advice given to you by your health care provider. Make sure you discuss any questions you have with your health care provider.  Document Released: 12/08/2003 Document Revised: 08/13/2017 Document Reviewed: 09/26/2016  Elsevier Interactive Patient Education © 2017 [employer]

## 2018-01-22 ENCOUNTER — HOSPITAL ENCOUNTER (OUTPATIENT)
Dept: SLEEP MEDICINE | Facility: HOSPITAL | Age: 53
Discharge: HOME | End: 2018-01-22
Attending: INTERNAL MEDICINE | Admitting: INTERNAL MEDICINE

## 2018-01-22 VITALS
BODY MASS INDEX: 30.5 KG/M2 | DIASTOLIC BLOOD PRESSURE: 79 MMHG | WEIGHT: 230.16 LBS | HEIGHT: 73 IN | OXYGEN SATURATION: 96 % | SYSTOLIC BLOOD PRESSURE: 118 MMHG | HEART RATE: 62 BPM

## 2018-01-22 DIAGNOSIS — G47.33 OBSTRUCTIVE SLEEP APNEA, ADULT: ICD-10-CM

## 2018-01-22 DIAGNOSIS — R06.83 SNORING: ICD-10-CM

## 2018-01-22 PROCEDURE — 95806 SLEEP STUDY UNATT&RESP EFFT: CPT

## 2018-01-22 PROCEDURE — 95806 SLEEP STUDY UNATT&RESP EFFT: CPT | Performed by: INTERNAL MEDICINE

## 2018-01-24 DIAGNOSIS — I10 ESSENTIAL HYPERTENSION: ICD-10-CM

## 2018-01-24 DIAGNOSIS — G47.33 OSA (OBSTRUCTIVE SLEEP APNEA): Primary | ICD-10-CM

## 2018-01-24 DIAGNOSIS — R06.83 SNORING: ICD-10-CM

## 2018-01-25 NOTE — PROGRESS NOTES
CALLED PATIENT AND PT IS BUSY AT THE MOMENT AND WOULD LIKE TO CALL BACK AND HIS SET UP WITH PAP THERAPY. 11/25/2018

## 2018-01-26 NOTE — PROGRESS NOTES
Patient does not want to be set up at this time. Would like to wait until follow up appt 1/25/2018

## 2018-02-01 ENCOUNTER — TELEPHONE (OUTPATIENT)
Dept: SLEEP MEDICINE | Facility: HOSPITAL | Age: 53
End: 2018-02-01

## 2018-02-01 DIAGNOSIS — G47.33 OBSTRUCTIVE SLEEP APNEA, ADULT: Primary | ICD-10-CM

## 2018-02-01 NOTE — TELEPHONE ENCOUNTER
Patient called stating that he would like oral appliance. Will order  Robin Acosta MD [facility]  Sleep Medicine  Pulmonary and Critical Care Medicine

## 2018-02-01 NOTE — TELEPHONE ENCOUNTER
PATIENT CALLED AND STATED THAT HE IS INTERESTED IN A MANDIBULAR DEVICE INSTEAD OF CPAP AND HAS REQUESTED AN ORDER BE PLACED.    ONCE ORDER IS PLACED, IF WE COULD SEND LMN TO HIS INSURANCE FOR PRE-DERMINATION FAX# [phone]    PLEASE ADVISE

## 2018-02-01 NOTE — TELEPHONE ENCOUNTER
CALLED PATIENT TO ADVISE OF ORDER PLACED FOR MANDIBULAR DEVICE. PATIENT WILL FOLLOW-UP WITH DENTIST TO SEE IF THEY WILL DO THE FITTING AND CALL ME BACK

## 2018-06-08 ENCOUNTER — OFFICE VISIT (OUTPATIENT)
Dept: SLEEP MEDICINE | Facility: HOSPITAL | Age: 53
End: 2018-06-08

## 2018-06-08 VITALS
HEIGHT: 73 IN | BODY MASS INDEX: 31.28 KG/M2 | WEIGHT: 236 LBS | SYSTOLIC BLOOD PRESSURE: 118 MMHG | DIASTOLIC BLOOD PRESSURE: 78 MMHG | OXYGEN SATURATION: 94 % | HEART RATE: 60 BPM

## 2018-06-08 DIAGNOSIS — G47.33 OBSTRUCTIVE SLEEP APNEA, ADULT: Primary | ICD-10-CM

## 2018-06-08 DIAGNOSIS — E66.09 CLASS 1 OBESITY DUE TO EXCESS CALORIES WITHOUT SERIOUS COMORBIDITY WITH BODY MASS INDEX (BMI) OF 30.0 TO 30.9 IN ADULT: ICD-10-CM

## 2018-06-08 PROCEDURE — 99214 OFFICE O/P EST MOD 30 MIN: CPT | Performed by: INTERNAL MEDICINE

## 2018-06-08 NOTE — PROGRESS NOTES
Subjective   Robin Whitlock is a 53 y.o. male is here today for follow-up.  He is followed with obstructive sleep apnea.  His primary care physician is Dr. Arana.  He's also been seen by Dr. Frank    History of Present Illness  Patient was seen here January 12 with a history of snoring and nonrestorative sleep.  He has history coronary artery disease stents placement has a history of hypertension and obesity.  He had sleep study on January 23.  He thought it was fairly usual night.  He says usually sleeps on his side.  After discussion he decided to get an oral appliance and he is been using that.  He has not been noted to have snoring or apneas when wearing it.  He feels fairly rested.  His fit bit indicates he is having less restless sleep than before.  He reiterates again he does not think he could ever uses CPAP.  Past Medical History:   Diagnosis Date   • BPH (benign prostatic hypertrophy)    • CAD (coronary artery disease)    • Chronic ischemic heart disease    • Cystic acne    • ED (erectile dysfunction) of non-organic origin    • Fracture of humerus 2013   • History of myocardial infarction    • Hyperlipidemia    • Hypertension    • BR[place] (obstructive sleep apnea)    • Tobacco dependence        Past Surgical History:   Procedure Laterality Date   • CARDIAC CATHETERIZATION  2012   • CORONARY STENT PLACEMENT  2012           Current Outpatient Prescriptions:   •  amLODIPine (NORVASC) 2.5 MG tablet, TAKE 1 TABLET BY MOUTH  DAILY, Disp: 90 tablet, Rfl: 3  •  aspirin 81 MG tablet, Take 81 mg by mouth daily., Disp: , Rfl:   •  cholecalciferol (VITAMIN D3) 1000 UNITS tablet, Take 2,000 Units by mouth daily., Disp: , Rfl:   •  CIALIS 5 MG tablet, TAKE 1 TABLET BY MOUTH DAILY, Disp: 30 tablet, Rfl: 5  •  clopidogrel (PLAVIX) 75 MG tablet, TAKE 1 TABLET BY MOUTH  DAILY, Disp: 90 tablet, Rfl: 1  •  ezetimibe (ZETIA) 10 MG tablet, TAKE 1 TABLET BY MOUTH  DAILY, Disp: 90 tablet, Rfl: 3  •  isosorbide mononitrate (IMDUR)  "30 MG 24 hr tablet, TAKE 1 TABLET BY MOUTH  DAILY, Disp: 90 tablet, Rfl: 3  •  metoprolol tartrate (LOPRESSOR) 25 MG tablet, TAKE 1 TABLET BY MOUTH  TWICE A DAY, Disp: 180 tablet, Rfl: 1  •  nitroglycerin (NITROSTAT) 0.4 MG SL tablet, Place 1 tablet under the tongue Every 5 (Five) Minutes As Needed for chest pain. Take no more than 3 doses in 15 minutes., Disp: 25 tablet, Rfl: 2  •  rosuvastatin (CRESTOR) 40 MG tablet, TAKE 1 TABLET BY MOUTH  DAILY, Disp: 90 tablet, Rfl: 1    Allergies   Allergen Reactions   • Wasp Venom Swelling     Swarmed as a child and told to avoid   • Sulfa Antibiotics Rash       The following portions of the patient's history were reviewed and updated as appropriate: allergies, current medications and problem list.    Review of Systems   Constitutional: Negative.    HENT: Positive for congestion and postnasal drip.    Eyes: Negative.    Respiratory: Negative.    Cardiovascular: Negative.    Gastrointestinal: Negative.    Endocrine: Negative.    Genitourinary: Negative.    Musculoskeletal: Positive for arthralgias and joint swelling.   Skin: Negative.    Allergic/Immunologic: Negative.    Neurological: Negative.    Hematological: Negative.    Psychiatric/Behavioral: The patient is nervous/anxious.     [place] scores 5/24    Objective     /78   Pulse 60   Ht 185.4 cm (72.99\")   Wt 107 kg (236 lb)   SpO2 94%   BMI 31.14 kg/m²     Physical Exam   Constitutional: He is oriented to person, place, and time. He appears well-developed and well-nourished.   He is obese.   HENT:   Head: Normocephalic and atraumatic.   He has Mallampati class IV anatomy.   Eyes: EOM are normal. Pupils are equal, round, and reactive to light.   Neck: Normal range of motion. Neck supple.   Cardiovascular: Normal rate, regular rhythm and normal heart sounds.    Pulmonary/Chest: Effort normal and breath sounds normal.   Abdominal: Soft. Bowel sounds are normal.   Musculoskeletal: Normal range of motion. He " exhibits no edema.   Neurological: He is alert and oriented to person, place, and time.   Skin: Skin is warm and dry.   Psychiatric: He has a normal mood and affect. His behavior is normal.    home sleep testing on January 23 showed overall an AHI of 46.9.  This is consistent with severe obstructive sleep apnea.  His index when supine was 94.5.  On his left side his index was 23.9.  On his right side his index is 12.3.          Assessment/Plan   Robin was seen today for follow-up.    Diagnoses and all orders for this visit:    Obstructive sleep apnea, adult    Class 1 obesity due to excess calories without serious comorbidity with body mass index (BMI) of 30.0 to 30.9 in adult    Patient has severe obstructive sleep apnea but has significant positional effect and is only mild to moderate when using lateral position.  He is using his oral appliance regular.  I've strongly encouraged him to use the oral appliance and maintain lateral position sleep.  He is encouraged to achieve ideal body weight.  He is to avoid alcohol and sedatives close to bedtime.  We will plan to have him return here only as needed.  If he changes his mind regarding his desired therapy for sleep apnea he is to contact us.             Robin Acosta MD [facility]  Sleep Medicine  Pulmonary and Critical Care Medicine      06/08/18  4:33 PM

## 2018-07-09 RX ORDER — ROSUVASTATIN CALCIUM 40 MG/1
TABLET, COATED ORAL
Qty: 90 TABLET | Refills: 0 | Status: SHIPPED | OUTPATIENT
Start: 2018-07-09 | End: 2018-10-03 | Stop reason: SDUPTHER

## 2018-07-09 RX ORDER — CLOPIDOGREL BISULFATE 75 MG/1
TABLET ORAL
Qty: 90 TABLET | Refills: 0 | Status: SHIPPED | OUTPATIENT
Start: 2018-07-09 | End: 2018-10-03 | Stop reason: SDUPTHER

## 2018-07-18 ENCOUNTER — OFFICE VISIT (OUTPATIENT)
Dept: CARDIOLOGY | Facility: CLINIC | Age: 53
End: 2018-07-18

## 2018-07-18 VITALS
HEIGHT: 71 IN | SYSTOLIC BLOOD PRESSURE: 105 MMHG | WEIGHT: 234 LBS | HEART RATE: 62 BPM | DIASTOLIC BLOOD PRESSURE: 84 MMHG | BODY MASS INDEX: 32.76 KG/M2

## 2018-07-18 DIAGNOSIS — E78.5 DYSLIPIDEMIA: ICD-10-CM

## 2018-07-18 DIAGNOSIS — I25.9 ISCHEMIC HEART DISEASE: Primary | ICD-10-CM

## 2018-07-18 DIAGNOSIS — I10 ESSENTIAL HYPERTENSION: ICD-10-CM

## 2018-07-18 PROCEDURE — 99214 OFFICE O/P EST MOD 30 MIN: CPT | Performed by: INTERNAL MEDICINE

## 2018-07-18 NOTE — PROGRESS NOTES
Subjective:     Encounter Date:07/18/2018    Patient ID: Robin Whitlock is a 53 y.o. 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     REFERRING PHYSICIAN: Ward Arana MD  SLEEP PHYSICIAN: Robin Acosta MD .    Chief Complaint:   Chief Complaint   Patient presents with   • Hypertension   • Coronary Artery Disease     Problem List:  1. Chronic ischemic heart disease:  a. Remote apparent non ST elevation myocardial infarction with abnormal cardiac enzymes and diagnostic coronary arteriography with angioplasty and stent deployment x2 - data deficit, [place], December 2010.  b. Reported acceptable stress test - data deficit, August 2011.   c. Recurrent chest pain syndrome with diagnostic coronary arteriography and angioplasty and stent deployment - data deficit, September 2012.   d. Recurrent chest pain syndrome with acceptable Cardiolite GXT (100% PMHR; 89% of predicted exercise capacity) without ischemic EKG changes, marked ectopy, anginal type chest discomfort without myocardial scar/reversible ischemia with preserved LVEF (0.62) with continued medical therapy felt warranted, October 2014.   e. Residual class I symptoms.   2. Chronic hypertension - probable essential.   3. Dyslipidemia.   4. Chronic lower tract obstructive syndrome - probable BPH.  5. Intermittent depression.   6. Chronic tobacco use: Discontinued December 2015 and restarted spring 2017, Discontinued 10/27/17.  7. Severe sleep apnea per sleep study (01/2018) with recommendations for CPAP.     Allergies   Allergen Reactions   • Wasp Venom Swelling     Swarmed as a child and told to avoid   • Sulfa Antibiotics Rash     Current Outpatient Prescriptions:   •  amLODIPine (NORVASC) 2.5 MG tablet, TAKE 1 TABLET BY MOUTH  DAILY, Disp: 90 tablet, Rfl: 3  •  aspirin 81 MG tablet, Take 81 mg by mouth daily., Disp: , Rfl:   •  cholecalciferol (VITAMIN D3) 1000 UNITS tablet, Take 2,000 Units by mouth daily.,  Disp: , Rfl:   •  CIALIS 5 MG tablet, TAKE 1 TABLET BY MOUTH DAILY, Disp: 30 tablet, Rfl: 5  •  clopidogrel (PLAVIX) 75 MG tablet, TAKE 1 TABLET BY MOUTH  DAILY, Disp: 90 tablet, Rfl: 0  •  ezetimibe (ZETIA) 10 MG tablet, TAKE 1 TABLET BY MOUTH  DAILY, Disp: 90 tablet, Rfl: 3  •  isosorbide mononitrate (IMDUR) 30 MG 24 hr tablet, TAKE 1 TABLET BY MOUTH  DAILY, Disp: 90 tablet, Rfl: 3  •  metoprolol tartrate (LOPRESSOR) 25 MG tablet, TAKE 1 TABLET BY MOUTH  TWICE A DAY, Disp: 180 tablet, Rfl: 0  •  nitroglycerin (NITROSTAT) 0.4 MG SL tablet, Place 1 tablet under the tongue Every 5 (Five) Minutes As Needed for chest pain. Take no more than 3 doses in 15 minutes., Disp: 25 tablet, Rfl: 2  •  rosuvastatin (CRESTOR) 40 MG tablet, TAKE 1 TABLET BY MOUTH  DAILY, Disp: 90 tablet, Rfl: 0    HISTORY OF PRESENT ILLNESS:  Patient returns today for a 6 month follow-up. The patient states that he has been traveling often for his job, so he has not been doing as many outdoor activities as much as he wishes. He remains active by walking daily. In January 2018, he underwent a sleep study per Dr. Acosta with results demonstrating severe sleep apnea with a recommendation for a trial of CPAP therapy. He currently uses a portable CPAP rather than an at-home CPAP because of how often he flies and he usually does not check a bag. He states that he tolerates the therapy well. His work visa will last until October 2018, and his  is working to get it extended to December 2018. The patient has happily reports that he has not smoked cigarettes since his cessation of tobacco abuse in October 2017. The patient wonders about using essential oil diffusers as a supplement. Patient otherwise denies chest pain, shortness of breath, PND, edema, palpitations, syncope or presyncope at this time. He is a big [employer] football fan and looks forward to the [employer] football season.     ROS   Obtained and otherwise negative except as outlined in problem  "list and HPI.    Procedures: None    Objective:     Vitals:    07/18/18 1542 07/18/18 1546   BP: 121/85 105/84   BP Location: Left arm Left arm   Patient Position: Sitting Standing   Pulse: 60 62   Weight: 106 kg (234 lb) 106 kg (234 lb)   Height: 180.3 cm (71\") 180.3 cm (71\")     Body mass index is 32.64 kg/m².  Last weight in January 2018: 232 lb.    Physical Exam   Constitutional: He is oriented to person, place, and time. He appears well-developed and well-nourished.   Neck: No JVD present. Carotid bruit is not present. No thyromegaly present.   Cardiovascular: Regular rhythm, S1 normal, S2 normal and normal heart sounds.  Exam reveals no gallop, no S3 and no friction rub.    No murmur heard.  Pulses:       Dorsalis pedis pulses are 2+ on the right side, and 2+ on the left side.        Posterior tibial pulses are 2+ on the right side, and 2+ on the left side.   Pulmonary/Chest: Effort normal and breath sounds normal. He has no wheezes. He has no rhonchi. He has no rales.   Abdominal: Soft. He exhibits no mass. There is no hepatosplenomegaly. There is no tenderness. There is no guarding.   Bowel sounds audible x4   Musculoskeletal: Normal range of motion. He exhibits no edema.   Lymphadenopathy:     He has no cervical adenopathy.   Neurological: He is alert and oriented to person, place, and time.   Skin: Skin is warm, dry and intact. No rash noted.   Vitals reviewed.    Lab Review:   Lab Results   Component Value Date    GLUCOSE 102 (H) 09/14/2017    BUN 14 09/14/2017    CREATININE 0.80 09/14/2017    EGFRIFNONA 102 09/14/2017    BCR 17.5 09/14/2017    CO2 24.0 09/14/2017    CALCIUM 9.1 09/14/2017    ALBUMIN 4.30 09/14/2017    AST 35 (H) 09/14/2017    ALT 41 (H) 09/14/2017     Lab Results   Component Value Date    WBC 6.43 09/14/2017    HGB 14.4 09/14/2017    HCT 43.1 09/14/2017    MCV 96.4 09/14/2017     09/14/2017     Lab Results   Component Value Date    HGBA1C 5.20 09/14/2017     Lab Results "   Component Value Date    TSH 2.164 09/14/2017     Lab Results   Component Value Date    CHOL 150 09/14/2017    CHOL 140 09/09/2016     Lab Results   Component Value Date    TRIG 171 (H) 09/14/2017    TRIG 174 (H) 09/09/2016     Lab Results   Component Value Date    HDL 43 09/14/2017    HDL 44 09/09/2016     Lab Results   Component Value Date    LDL 91 09/14/2017    LDL 82 09/09/2016     Assessment:   Overall continued acceptable course with no interim cardiopulmonary complaints with good functional status. We will defer additional diagnostic or therapeutic intervention from a cardiac perspective at this time.     Diagnosis Plan   1. Ischemic heart disease  No recurrent angina pectoris or CHF; continue current treatment.    2. Essential hypertension  Acceptable control.    3. Dyslipidemia  No new data to review; he is encouraged to influenza immunization and FLP with Dr. Arana in the next 2-3 months.      Plan:     1. Patient to continue current medications and close follow up with the above providers.  2. Tentative cardiology follow up in January 2019 or patient may return sooner PRN.  3. Patient advised to receive flu shot in the fall.     I, Florentino Frank MD, [facility], personally performed the services described in this documentation as scribed by the above named individual in my presence, and it is both accurate and complete. At 4:18 PM on 07/18/2018.

## 2018-10-04 DIAGNOSIS — N40.1 BENIGN NON-NODULAR PROSTATIC HYPERPLASIA WITH LOWER URINARY TRACT SYMPTOMS: ICD-10-CM

## 2018-10-04 RX ORDER — TADALAFIL 5 MG
TABLET ORAL
Qty: 30 TABLET | Refills: 5 | OUTPATIENT
Start: 2018-10-04

## 2018-10-04 NOTE — TELEPHONE ENCOUNTER
The patient is past due for his annual wellness evaluation.  Please help the patient schedule an annual wellness evaluation and send in enough Cialis 5 mg daily until his appointment.  Thank you.

## 2018-10-07 RX ORDER — CLOPIDOGREL BISULFATE 75 MG/1
TABLET ORAL
Qty: 90 TABLET | Refills: 1 | Status: SHIPPED | OUTPATIENT
Start: 2018-10-07 | End: 2019-02-01 | Stop reason: SDUPTHER

## 2018-10-07 RX ORDER — ROSUVASTATIN CALCIUM 40 MG/1
TABLET, COATED ORAL
Qty: 90 TABLET | Refills: 1 | Status: SHIPPED | OUTPATIENT
Start: 2018-10-07 | End: 2019-02-01 | Stop reason: SDUPTHER

## 2018-10-11 ENCOUNTER — OFFICE VISIT (OUTPATIENT)
Dept: FAMILY MEDICINE CLINIC | Facility: CLINIC | Age: 53
End: 2018-10-11

## 2018-10-11 VITALS
HEIGHT: 71 IN | OXYGEN SATURATION: 98 % | HEART RATE: 62 BPM | SYSTOLIC BLOOD PRESSURE: 116 MMHG | RESPIRATION RATE: 18 BRPM | BODY MASS INDEX: 32.31 KG/M2 | DIASTOLIC BLOOD PRESSURE: 72 MMHG | WEIGHT: 230.8 LBS

## 2018-10-11 DIAGNOSIS — Z00.00 HEALTHCARE MAINTENANCE: Primary | ICD-10-CM

## 2018-10-11 DIAGNOSIS — N40.1 BENIGN NON-NODULAR PROSTATIC HYPERPLASIA WITH LOWER URINARY TRACT SYMPTOMS: ICD-10-CM

## 2018-10-11 PROBLEM — E78.5 DYSLIPIDEMIA: Status: RESOLVED | Noted: 2018-01-10 | Resolved: 2018-10-11

## 2018-10-11 PROBLEM — R06.83 SNORING: Status: RESOLVED | Noted: 2018-01-12 | Resolved: 2018-10-11

## 2018-10-11 LAB
ALBUMIN SERPL-MCNC: 4.56 G/DL (ref 3.2–4.8)
ALBUMIN/GLOB SERPL: 1.9 G/DL (ref 1.5–2.5)
ALP SERPL-CCNC: 61 U/L (ref 25–100)
ALT SERPL W P-5'-P-CCNC: 49 U/L (ref 7–40)
ANION GAP SERPL CALCULATED.3IONS-SCNC: 6 MMOL/L (ref 3–11)
ARTICHOKE IGE QN: 82 MG/DL (ref 0–130)
AST SERPL-CCNC: 44 U/L (ref 0–33)
BASOPHILS # BLD AUTO: 0.07 10*3/MM3 (ref 0–0.2)
BASOPHILS NFR BLD AUTO: 1 % (ref 0–1)
BILIRUB BLD-MCNC: NEGATIVE MG/DL
BILIRUB SERPL-MCNC: 0.7 MG/DL (ref 0.3–1.2)
BUN BLD-MCNC: 13 MG/DL (ref 9–23)
BUN/CREAT SERPL: 14.8 (ref 7–25)
CALCIUM SPEC-SCNC: 9.4 MG/DL (ref 8.7–10.4)
CHLORIDE SERPL-SCNC: 100 MMOL/L (ref 99–109)
CHOLEST SERPL-MCNC: 138 MG/DL (ref 0–200)
CLARITY, POC: CLEAR
CO2 SERPL-SCNC: 29 MMOL/L (ref 20–31)
COLOR UR: YELLOW
CREAT BLD-MCNC: 0.88 MG/DL (ref 0.6–1.3)
CRP SERPL-MCNC: 0.07 MG/DL (ref 0–1)
DEPRECATED RDW RBC AUTO: 42.6 FL (ref 37–54)
EOSINOPHIL # BLD AUTO: 0.21 10*3/MM3 (ref 0–0.3)
EOSINOPHIL NFR BLD AUTO: 3 % (ref 0–3)
ERYTHROCYTE [DISTWIDTH] IN BLOOD BY AUTOMATED COUNT: 12.4 % (ref 11.3–14.5)
GFR SERPL CREATININE-BSD FRML MDRD: 91 ML/MIN/1.73
GLOBULIN UR ELPH-MCNC: 2.4 GM/DL
GLUCOSE BLD-MCNC: 92 MG/DL (ref 70–100)
GLUCOSE UR STRIP-MCNC: NEGATIVE MG/DL
HBA1C MFR BLD: 5.7 % (ref 4.8–5.6)
HCT VFR BLD AUTO: 44.2 % (ref 38.9–50.9)
HDLC SERPL-MCNC: 43 MG/DL (ref 40–60)
HGB BLD-MCNC: 14.7 G/DL (ref 13.1–17.5)
HIV1+2 AB SER QL: NORMAL
IMM GRANULOCYTES # BLD: 0.02 10*3/MM3 (ref 0–0.03)
IMM GRANULOCYTES NFR BLD: 0.3 % (ref 0–0.6)
KETONES UR QL: NEGATIVE
LEUKOCYTE EST, POC: NEGATIVE
LYMPHOCYTES # BLD AUTO: 1.74 10*3/MM3 (ref 0.6–4.8)
LYMPHOCYTES NFR BLD AUTO: 25.2 % (ref 24–44)
MCH RBC QN AUTO: 31.6 PG (ref 27–31)
MCHC RBC AUTO-ENTMCNC: 33.3 G/DL (ref 32–36)
MCV RBC AUTO: 95.1 FL (ref 80–99)
MONOCYTES # BLD AUTO: 0.85 10*3/MM3 (ref 0–1)
MONOCYTES NFR BLD AUTO: 12.3 % (ref 0–12)
NEUTROPHILS # BLD AUTO: 4.04 10*3/MM3 (ref 1.5–8.3)
NEUTROPHILS NFR BLD AUTO: 58.5 % (ref 41–71)
NITRITE UR-MCNC: NEGATIVE MG/ML
PH UR: 6 [PH] (ref 5–8)
PLATELET # BLD AUTO: 156 10*3/MM3 (ref 150–450)
PMV BLD AUTO: 12.2 FL (ref 6–12)
POTASSIUM BLD-SCNC: 4.2 MMOL/L (ref 3.5–5.5)
PROT SERPL-MCNC: 7 G/DL (ref 5.7–8.2)
PROT UR STRIP-MCNC: NEGATIVE MG/DL
PSA SERPL-MCNC: 0.42 NG/ML (ref 0–4)
RBC # BLD AUTO: 4.65 10*6/MM3 (ref 4.2–5.76)
RBC # UR STRIP: NEGATIVE /UL
SODIUM BLD-SCNC: 135 MMOL/L (ref 132–146)
SP GR UR: 1.02 (ref 1–1.03)
TRIGL SERPL-MCNC: 132 MG/DL (ref 0–150)
TSH SERPL DL<=0.05 MIU/L-ACNC: 2.32 MIU/ML (ref 0.35–5.35)
URATE SERPL-MCNC: 7.1 MG/DL (ref 3.7–9.2)
UROBILINOGEN UR QL: NORMAL
WBC NRBC COR # BLD: 6.91 10*3/MM3 (ref 3.5–10.8)

## 2018-10-11 PROCEDURE — 99396 PREV VISIT EST AGE 40-64: CPT | Performed by: FAMILY MEDICINE

## 2018-10-11 PROCEDURE — 83036 HEMOGLOBIN GLYCOSYLATED A1C: CPT | Performed by: FAMILY MEDICINE

## 2018-10-11 PROCEDURE — 81003 URINALYSIS AUTO W/O SCOPE: CPT | Performed by: FAMILY MEDICINE

## 2018-10-11 PROCEDURE — G0432 EIA HIV-1/HIV-2 SCREEN: HCPCS | Performed by: FAMILY MEDICINE

## 2018-10-11 PROCEDURE — 84443 ASSAY THYROID STIM HORMONE: CPT | Performed by: FAMILY MEDICINE

## 2018-10-11 PROCEDURE — 36415 COLL VENOUS BLD VENIPUNCTURE: CPT | Performed by: FAMILY MEDICINE

## 2018-10-11 PROCEDURE — 86140 C-REACTIVE PROTEIN: CPT | Performed by: FAMILY MEDICINE

## 2018-10-11 PROCEDURE — 80061 LIPID PANEL: CPT | Performed by: FAMILY MEDICINE

## 2018-10-11 PROCEDURE — 80053 COMPREHEN METABOLIC PANEL: CPT | Performed by: FAMILY MEDICINE

## 2018-10-11 PROCEDURE — 84550 ASSAY OF BLOOD/URIC ACID: CPT | Performed by: FAMILY MEDICINE

## 2018-10-11 PROCEDURE — 85025 COMPLETE CBC W/AUTO DIFF WBC: CPT | Performed by: FAMILY MEDICINE

## 2018-10-11 PROCEDURE — G0103 PSA SCREENING: HCPCS | Performed by: FAMILY MEDICINE

## 2018-10-11 RX ORDER — TADALAFIL 5 MG/1
5 TABLET ORAL DAILY
Qty: 30 TABLET | Refills: 11 | Status: SHIPPED | OUTPATIENT
Start: 2018-10-11 | End: 2020-03-03 | Stop reason: SDUPTHER

## 2018-10-11 NOTE — PROGRESS NOTES
Subjective   Robin Whitlock is a 53 y.o. male.     History of Present Illness   He is here for his annual fasting wellness evaluation.  He declines the annual flu shot today.  He voices no acute symptoms.    Review of Systems   Constitutional: Negative.    HENT: Negative.    Eyes: Negative.    Respiratory: Negative.    Cardiovascular: Negative.    Gastrointestinal: Negative.    Endocrine: Negative.    Genitourinary: Negative.    Musculoskeletal: Negative.    Skin: Negative.    Allergic/Immunologic: Negative.    Neurological: Negative.    Hematological: Negative.    Psychiatric/Behavioral: Negative.        Objective   Physical Exam   Constitutional: He is oriented to person, place, and time. He appears well-developed and well-nourished. He is cooperative.   HENT:   Head: Normocephalic and atraumatic.   Right Ear: Hearing and external ear normal.   Left Ear: Hearing and external ear normal.   Nose: Nose normal.   Mouth/Throat: Uvula is midline, oropharynx is clear and moist and mucous membranes are normal.   Eyes: Pupils are equal, round, and reactive to light. Conjunctivae and EOM are normal. No scleral icterus.   Neck: Trachea normal and normal range of motion. Neck supple. No JVD present. Carotid bruit is not present. No thyromegaly present.   Cardiovascular: Normal rate, regular rhythm, normal heart sounds and intact distal pulses.    Pulmonary/Chest: Effort normal and breath sounds normal.   Abdominal: Soft. Bowel sounds are normal. There is no hepatosplenomegaly. There is no tenderness.   Musculoskeletal: Normal range of motion.   Lymphadenopathy:     He has no cervical adenopathy.   Neurological: He is alert and oriented to person, place, and time. He has normal strength and normal reflexes. No sensory deficit. Gait normal.   Skin: Skin is warm and dry.   Psychiatric: He has a normal mood and affect. His speech is normal and behavior is normal. Judgment and thought content normal. Cognition and memory are  normal.   Nursing note and vitals reviewed.      Assessment/Plan   Diagnoses and all orders for this visit:    Healthcare maintenance  -     POC Urinalysis Dipstick, Automated  -     Comprehensive Metabolic Panel  -     CBC & Differential  -     Lipid Panel  -     TSH  -     Uric Acid  -     C-reactive Protein  -     PSA Screen  -     HIV-1 / O / 2 Ag / Antibody 4th Generation  -     Hemoglobin A1c    The patient is here for a health maintenance visit.  Currently, the patient consumes a calorie enriched diet and has an adequate exercise regimen. Screening lab work is ordered.  Immunizations are reported as current.  Advice and education is given regarding nutrition, aerobic exercise, routine dental evaluations, routine eye exams, reproductive health, cardiovascular risk reduction, sunscreen use, self skin examination (annual dermatology evaluations) and seat belt use (general overall safety).  Further recommendations after lab evaluation.  Annual wellness evaluations recommended.

## 2018-12-05 RX ORDER — AMLODIPINE BESYLATE 2.5 MG/1
TABLET ORAL
Qty: 90 TABLET | Refills: 3 | Status: SHIPPED | OUTPATIENT
Start: 2018-12-05 | End: 2019-10-15 | Stop reason: SDUPTHER

## 2018-12-05 RX ORDER — EZETIMIBE 10 MG/1
TABLET ORAL
Qty: 90 TABLET | Refills: 3 | Status: SHIPPED | OUTPATIENT
Start: 2018-12-05 | End: 2019-10-15 | Stop reason: SDUPTHER

## 2018-12-05 RX ORDER — ISOSORBIDE MONONITRATE 30 MG/1
TABLET, EXTENDED RELEASE ORAL
Qty: 90 TABLET | Refills: 3 | Status: SHIPPED | OUTPATIENT
Start: 2018-12-05 | End: 2019-10-15 | Stop reason: SDUPTHER

## 2019-02-01 ENCOUNTER — OFFICE VISIT (OUTPATIENT)
Dept: CARDIOLOGY | Facility: CLINIC | Age: 54
End: 2019-02-01

## 2019-02-01 VITALS
DIASTOLIC BLOOD PRESSURE: 75 MMHG | HEIGHT: 73 IN | BODY MASS INDEX: 30.62 KG/M2 | SYSTOLIC BLOOD PRESSURE: 99 MMHG | HEART RATE: 65 BPM | WEIGHT: 231 LBS

## 2019-02-01 DIAGNOSIS — I25.9 ISCHEMIC HEART DISEASE: Primary | ICD-10-CM

## 2019-02-01 DIAGNOSIS — I10 ESSENTIAL HYPERTENSION: ICD-10-CM

## 2019-02-01 DIAGNOSIS — E78.5 DYSLIPIDEMIA: ICD-10-CM

## 2019-02-01 PROCEDURE — 99214 OFFICE O/P EST MOD 30 MIN: CPT | Performed by: INTERNAL MEDICINE

## 2019-02-01 RX ORDER — ROSUVASTATIN CALCIUM 40 MG/1
TABLET, COATED ORAL
Qty: 90 TABLET | Refills: 0 | Status: SHIPPED | OUTPATIENT
Start: 2019-02-01 | End: 2019-04-14 | Stop reason: SDUPTHER

## 2019-02-01 RX ORDER — CLOPIDOGREL BISULFATE 75 MG/1
TABLET ORAL
Qty: 90 TABLET | Refills: 0 | Status: SHIPPED | OUTPATIENT
Start: 2019-02-01 | End: 2019-04-14 | Stop reason: SDUPTHER

## 2019-02-01 NOTE — PROGRESS NOTES
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     REFERRING PHYSICIAN: Ward Arana MD  SLEEP PHYSICIAN: Robin Acosta MD    Chief Complaint:   Chief Complaint   Patient presents with   • Coronary Artery Disease   • Hypertension     Problem List:  1. Chronic ischemic heart disease:  a. Remote apparent non ST elevation myocardial infarction with abnormal cardiac enzymes and diagnostic coronary arteriography with angioplasty and stent deployment x2 - data deficit, [place], December 2010.  b. Reported acceptable stress test - data deficit, August 2011.   c. Recurrent chest pain syndrome with diagnostic coronary arteriography and angioplasty and stent deployment - data deficit, September 2012.   d. Recurrent chest pain syndrome with acceptable Cardiolite GXT (100% PMHR; 89% of predicted exercise capacity) without ischemic EKG changes, marked ectopy, anginal type chest discomfort without myocardial scar/reversible ischemia with preserved LVEF (0.62) with continued medical therapy felt warranted, October 2014.   e. Residual class I symptoms.   2. Chronic hypertension - probable essential.   3. Dyslipidemia.   4. Chronic lower tract obstructive syndrome - probable BPH.  5. Intermittent depression.   6. Chronic tobacco use: Discontinued December 2015 and restarted spring 2017, Discontinued 10/27/17.  7. Severe sleep apnea per sleep study (January 2018) with recommendations for CPAP.    Allergies   Allergen Reactions   • Wasp Venom Swelling     Swarmed as a child and told to avoid   • Sulfa Antibiotics Rash         Current Outpatient Medications:   •  amLODIPine (NORVASC) 2.5 MG tablet, TAKE 1 TABLET BY MOUTH  DAILY, Disp: 90 tablet, Rfl: 3  •  aspirin 81 MG tablet, Take 81 mg by mouth daily., Disp: , Rfl:   •  cholecalciferol (VITAMIN D3) 1000 UNITS tablet, Take 2,000 Units by mouth  "daily., Disp: , Rfl:   •  clopidogrel (PLAVIX) 75 MG tablet, TAKE 1 TABLET BY MOUTH  DAILY, Disp: 90 tablet, Rfl: 0  •  ezetimibe (ZETIA) 10 MG tablet, TAKE 1 TABLET BY MOUTH  DAILY, Disp: 90 tablet, Rfl: 3  •  isosorbide mononitrate (IMDUR) 30 MG 24 hr tablet, TAKE 1 TABLET BY MOUTH  DAILY, Disp: 90 tablet, Rfl: 3  •  metoprolol tartrate (LOPRESSOR) 25 MG tablet, TAKE 1 TABLET BY MOUTH  TWICE A DAY, Disp: 180 tablet, Rfl: 0  •  nitroglycerin (NITROSTAT) 0.4 MG SL tablet, Place 1 tablet under the tongue Every 5 (Five) Minutes As Needed for chest pain. Take no more than 3 doses in 15 minutes., Disp: 25 tablet, Rfl: 2  •  rosuvastatin (CRESTOR) 40 MG tablet, TAKE 1 TABLET BY MOUTH  DAILY, Disp: 90 tablet, Rfl: 0  •  tadalafil (CIALIS) 5 MG tablet, Take 1 tablet by mouth Daily., Disp: 30 tablet, Rfl: 11    HISTORY OF PRESENT ILLNESS:  Patient is here for 6 month follow-up.  The patient is here for a temporary vacation from work and is headed back to [place] soon.  He denies any chest pressure, shortness of breath, palpitations, presyncope, syncope, or edema.  He was having some nosebleeds about 2 weeks ago in [place] from the dry air, but this has now resolved.  He walks between 7-10 miles per day without difficulties.  He recently had some crowns placed at the dentist during this vacation back to [place].  Patient otherwise denies chest pain, shortness of breath, PND, edema, palpitations, syncope or presyncope at this time.        Review of Systems   All other systems reviewed and are negative.     Obtained and otherwise negative except as outlined in problem list and HPI.    Procedures       Objective:       Vitals:    02/01/19 1431 02/01/19 1432   BP: 115/80 99/75   BP Location: Left arm Left arm   Patient Position: Sitting Standing   Pulse: 62 65   Weight: 105 kg (231 lb) 105 kg (231 lb)   Height: 185.4 cm (73\") 185.4 cm (73\")     Body mass index is 30.48 kg/m².  Last weight July 2018 was 234 pounds    Physical Exam "   Constitutional: He is oriented to person, place, and time. He appears well-developed and well-nourished.   Neck: No JVD present. Carotid bruit is not present. No thyromegaly present.   Cardiovascular: Regular rhythm, S1 normal, S2 normal and normal heart sounds. Exam reveals no gallop, no S3 and no friction rub.   No murmur heard.  Pulses:       Dorsalis pedis pulses are 2+ on the right side, and 2+ on the left side.        Posterior tibial pulses are 2+ on the right side, and 2+ on the left side.   Pulmonary/Chest: Effort normal and breath sounds normal. He has no wheezes. He has no rhonchi. He has no rales.   Abdominal: Soft. He exhibits no mass. There is no hepatosplenomegaly. There is no tenderness. There is no guarding.   Bowel sounds audible x4   Musculoskeletal: Normal range of motion. He exhibits no edema.   Lymphadenopathy:     He has no cervical adenopathy.   Neurological: He is alert and oriented to person, place, and time.   Skin: Skin is warm, dry and intact. No rash noted.   Vitals reviewed.        Lab Review:   Lab Results   Component Value Date    GLUCOSE 92 10/11/2018    BUN 13 10/11/2018    CREATININE 0.88 10/11/2018    EGFRIFNONA 91 10/11/2018    BCR 14.8 10/11/2018    CO2 29.0 10/11/2018    CALCIUM 9.4 10/11/2018    ALBUMIN 4.56 10/11/2018    AST 44 (H) 10/11/2018    ALT 49 (H) 10/11/2018       Lab Results   Component Value Date    WBC 6.91 10/11/2018    HGB 14.7 10/11/2018    HCT 44.2 10/11/2018    MCV 95.1 10/11/2018     10/11/2018       Lab Results   Component Value Date    HGBA1C 5.70 (H) 10/11/2018       Lab Results   Component Value Date    TSH 2.323 10/11/2018       Lab Results   Component Value Date    CHOL 138 10/11/2018    CHOL 150 09/14/2017     Lab Results   Component Value Date    TRIG 132 10/11/2018    TRIG 171 (H) 09/14/2017     Lab Results   Component Value Date    HDL 43 10/11/2018    HDL 43 09/14/2017     Lab Results   Component Value Date    LDL 82 10/11/2018    LDL 91  09/14/2017           Assessment:       Overall continued acceptable course with no interim cardiopulmonary complaints with good functional status. We will defer additional diagnostic or therapeutic intervention from a cardiac perspective at this time.  It has been 4 years since his last ischemic evaluation, so we will order a Cardiolite GXT and have him obtain CBC, CMP, and lipid panel when he returns in December 2019.     Diagnosis Plan   1. Ischemic heart disease  Stable, Cardiolite GXT, CBC, CMP, FLP; No recurrent angina pectoris or CHF on current activity schedule; continue current treatment     2. Essential hypertension  Controlled   3. Dyslipidemia  Acceptable          Plan:         1. Patient to continue current medications and close follow up with the above providers.  2. Tentative cardiology follow up in December 2019 or patient may return sooner PRN with the following studies:   A. Cardiolite GXT   B. CBC, CMP, and FLP   3.   1-800 card provided.    Scribed for Florentino Frank MD by Crystal Vieira, APRN. 2/1/2019  3:39 PM    I, Florentino Frank MD, [facility], personally performed the services described in this documentation as scribed by the above named individual in my presence, and it is both accurate and complete. At 3:16 PM on 02/01/2019

## 2019-04-15 RX ORDER — CLOPIDOGREL BISULFATE 75 MG/1
TABLET ORAL
Qty: 90 TABLET | Refills: 1 | Status: SHIPPED | OUTPATIENT
Start: 2019-04-15 | End: 2019-10-15 | Stop reason: SDUPTHER

## 2019-04-15 RX ORDER — ROSUVASTATIN CALCIUM 40 MG/1
TABLET, COATED ORAL
Qty: 90 TABLET | Refills: 1 | Status: SHIPPED | OUTPATIENT
Start: 2019-04-15 | End: 2019-10-15 | Stop reason: SDUPTHER

## 2019-10-15 ENCOUNTER — OFFICE VISIT (OUTPATIENT)
Dept: FAMILY MEDICINE CLINIC | Facility: CLINIC | Age: 54
End: 2019-10-15

## 2019-10-15 VITALS
OXYGEN SATURATION: 95 % | RESPIRATION RATE: 16 BRPM | TEMPERATURE: 97.6 F | WEIGHT: 225.8 LBS | SYSTOLIC BLOOD PRESSURE: 132 MMHG | DIASTOLIC BLOOD PRESSURE: 80 MMHG | HEIGHT: 73 IN | BODY MASS INDEX: 29.93 KG/M2 | HEART RATE: 61 BPM

## 2019-10-15 DIAGNOSIS — I25.10 CORONARY ARTERY DISEASE DUE TO LIPID RICH PLAQUE: ICD-10-CM

## 2019-10-15 DIAGNOSIS — Z00.00 HEALTHCARE MAINTENANCE: Primary | ICD-10-CM

## 2019-10-15 DIAGNOSIS — I25.83 CORONARY ARTERY DISEASE DUE TO LIPID RICH PLAQUE: ICD-10-CM

## 2019-10-15 LAB
ALBUMIN SERPL-MCNC: 4.9 G/DL (ref 3.5–5.2)
ALBUMIN/GLOB SERPL: 1.7 G/DL
ALP SERPL-CCNC: 58 U/L (ref 39–117)
ALT SERPL W P-5'-P-CCNC: 25 U/L (ref 1–41)
ANION GAP SERPL CALCULATED.3IONS-SCNC: 11.4 MMOL/L (ref 5–15)
AST SERPL-CCNC: 23 U/L (ref 1–40)
BASOPHILS # BLD AUTO: 0.06 10*3/MM3 (ref 0–0.2)
BASOPHILS NFR BLD AUTO: 1.1 % (ref 0–1.5)
BILIRUB BLD-MCNC: NEGATIVE MG/DL
BILIRUB SERPL-MCNC: 0.8 MG/DL (ref 0.2–1.2)
BUN BLD-MCNC: 14 MG/DL (ref 6–20)
BUN/CREAT SERPL: 14.7 (ref 7–25)
CALCIUM SPEC-SCNC: 9.6 MG/DL (ref 8.6–10.5)
CHLORIDE SERPL-SCNC: 100 MMOL/L (ref 98–107)
CHOLEST SERPL-MCNC: 152 MG/DL (ref 0–200)
CLARITY, POC: CLEAR
CO2 SERPL-SCNC: 28.6 MMOL/L (ref 22–29)
COLOR UR: YELLOW
CREAT BLD-MCNC: 0.95 MG/DL (ref 0.76–1.27)
CRP SERPL-MCNC: 0.06 MG/DL (ref 0–0.5)
DEPRECATED RDW RBC AUTO: 41.8 FL (ref 37–54)
EOSINOPHIL # BLD AUTO: 0.16 10*3/MM3 (ref 0–0.4)
EOSINOPHIL NFR BLD AUTO: 2.9 % (ref 0.3–6.2)
ERYTHROCYTE [DISTWIDTH] IN BLOOD BY AUTOMATED COUNT: 12 % (ref 12.3–15.4)
EXPIRATION DATE: NORMAL
GFR SERPL CREATININE-BSD FRML MDRD: 83 ML/MIN/1.73
GLOBULIN UR ELPH-MCNC: 2.9 GM/DL
GLUCOSE BLD-MCNC: 102 MG/DL (ref 65–99)
GLUCOSE UR STRIP-MCNC: NEGATIVE MG/DL
HBA1C MFR BLD: 5.75 % (ref 4.8–5.6)
HCT VFR BLD AUTO: 43.3 % (ref 37.5–51)
HDLC SERPL-MCNC: 52 MG/DL (ref 40–60)
HGB BLD-MCNC: 14.5 G/DL (ref 13–17.7)
HIV1+2 AB SER QL: NORMAL
IMM GRANULOCYTES # BLD AUTO: 0.01 10*3/MM3 (ref 0–0.05)
IMM GRANULOCYTES NFR BLD AUTO: 0.2 % (ref 0–0.5)
KETONES UR QL: NEGATIVE
LDLC SERPL CALC-MCNC: 76 MG/DL (ref 0–100)
LDLC/HDLC SERPL: 1.47 {RATIO}
LEUKOCYTE EST, POC: NEGATIVE
LYMPHOCYTES # BLD AUTO: 1.16 10*3/MM3 (ref 0.7–3.1)
LYMPHOCYTES NFR BLD AUTO: 21.4 % (ref 19.6–45.3)
Lab: NORMAL
MCH RBC QN AUTO: 31.8 PG (ref 26.6–33)
MCHC RBC AUTO-ENTMCNC: 33.5 G/DL (ref 31.5–35.7)
MCV RBC AUTO: 95 FL (ref 79–97)
MONOCYTES # BLD AUTO: 0.64 10*3/MM3 (ref 0.1–0.9)
MONOCYTES NFR BLD AUTO: 11.8 % (ref 5–12)
NEUTROPHILS # BLD AUTO: 3.4 10*3/MM3 (ref 1.7–7)
NEUTROPHILS NFR BLD AUTO: 62.6 % (ref 42.7–76)
NITRITE UR-MCNC: NEGATIVE MG/ML
NRBC BLD AUTO-RTO: 0 /100 WBC (ref 0–0.2)
PH UR: 5.5 [PH] (ref 5–8)
PLATELET # BLD AUTO: 175 10*3/MM3 (ref 140–450)
PMV BLD AUTO: 11.5 FL (ref 6–12)
POC CREATININE URINE: NORMAL
POC MICROALBUMIN URINE: NORMAL
POTASSIUM BLD-SCNC: 4.6 MMOL/L (ref 3.5–5.2)
PROT SERPL-MCNC: 7.8 G/DL (ref 6–8.5)
PROT UR STRIP-MCNC: NEGATIVE MG/DL
PSA SERPL-MCNC: 0.51 NG/ML (ref 0–4)
RBC # BLD AUTO: 4.56 10*6/MM3 (ref 4.14–5.8)
RBC # UR STRIP: NEGATIVE /UL
SODIUM BLD-SCNC: 140 MMOL/L (ref 136–145)
SP GR UR: 1.03 (ref 1–1.03)
T4 FREE SERPL-MCNC: 1.04 NG/DL (ref 0.93–1.7)
TRIGL SERPL-MCNC: 119 MG/DL (ref 0–150)
TSH SERPL DL<=0.05 MIU/L-ACNC: 2.43 UIU/ML (ref 0.27–4.2)
URATE SERPL-MCNC: 7.4 MG/DL (ref 3.4–7)
UROBILINOGEN UR QL: NORMAL
VLDLC SERPL-MCNC: 23.8 MG/DL (ref 5–40)
WBC NRBC COR # BLD: 5.43 10*3/MM3 (ref 3.4–10.8)

## 2019-10-15 PROCEDURE — 80053 COMPREHEN METABOLIC PANEL: CPT | Performed by: FAMILY MEDICINE

## 2019-10-15 PROCEDURE — 80061 LIPID PANEL: CPT | Performed by: FAMILY MEDICINE

## 2019-10-15 PROCEDURE — 90732 PPSV23 VACC 2 YRS+ SUBQ/IM: CPT | Performed by: FAMILY MEDICINE

## 2019-10-15 PROCEDURE — 82044 UR ALBUMIN SEMIQUANTITATIVE: CPT | Performed by: FAMILY MEDICINE

## 2019-10-15 PROCEDURE — 90674 CCIIV4 VAC NO PRSV 0.5 ML IM: CPT | Performed by: FAMILY MEDICINE

## 2019-10-15 PROCEDURE — 90472 IMMUNIZATION ADMIN EACH ADD: CPT | Performed by: FAMILY MEDICINE

## 2019-10-15 PROCEDURE — 99396 PREV VISIT EST AGE 40-64: CPT | Performed by: FAMILY MEDICINE

## 2019-10-15 PROCEDURE — 85025 COMPLETE CBC W/AUTO DIFF WBC: CPT | Performed by: FAMILY MEDICINE

## 2019-10-15 PROCEDURE — 84439 ASSAY OF FREE THYROXINE: CPT | Performed by: FAMILY MEDICINE

## 2019-10-15 PROCEDURE — G0103 PSA SCREENING: HCPCS | Performed by: FAMILY MEDICINE

## 2019-10-15 PROCEDURE — 81003 URINALYSIS AUTO W/O SCOPE: CPT | Performed by: FAMILY MEDICINE

## 2019-10-15 PROCEDURE — 90471 IMMUNIZATION ADMIN: CPT | Performed by: FAMILY MEDICINE

## 2019-10-15 PROCEDURE — 83036 HEMOGLOBIN GLYCOSYLATED A1C: CPT | Performed by: FAMILY MEDICINE

## 2019-10-15 PROCEDURE — 84443 ASSAY THYROID STIM HORMONE: CPT | Performed by: FAMILY MEDICINE

## 2019-10-15 PROCEDURE — 84550 ASSAY OF BLOOD/URIC ACID: CPT | Performed by: FAMILY MEDICINE

## 2019-10-15 PROCEDURE — G0432 EIA HIV-1/HIV-2 SCREEN: HCPCS | Performed by: FAMILY MEDICINE

## 2019-10-15 PROCEDURE — 86140 C-REACTIVE PROTEIN: CPT | Performed by: FAMILY MEDICINE

## 2019-10-15 RX ORDER — NITROGLYCERIN 0.4 MG/1
0.4 TABLET SUBLINGUAL
Qty: 25 TABLET | Refills: 2 | Status: SHIPPED | OUTPATIENT
Start: 2019-10-15

## 2019-10-15 RX ORDER — ISOSORBIDE MONONITRATE 30 MG/1
30 TABLET, EXTENDED RELEASE ORAL DAILY
Qty: 90 TABLET | Refills: 3 | Status: SHIPPED | OUTPATIENT
Start: 2019-10-15 | End: 2020-11-10 | Stop reason: SDUPTHER

## 2019-10-15 RX ORDER — EZETIMIBE 10 MG/1
10 TABLET ORAL DAILY
Qty: 90 TABLET | Refills: 3 | Status: SHIPPED | OUTPATIENT
Start: 2019-10-15 | End: 2020-11-10 | Stop reason: SDUPTHER

## 2019-10-15 RX ORDER — AMLODIPINE BESYLATE 2.5 MG/1
2.5 TABLET ORAL DAILY
Qty: 90 TABLET | Refills: 3 | Status: SHIPPED | OUTPATIENT
Start: 2019-10-15 | End: 2020-11-10 | Stop reason: SDUPTHER

## 2019-10-15 RX ORDER — CLOPIDOGREL BISULFATE 75 MG/1
75 TABLET ORAL DAILY
Qty: 90 TABLET | Refills: 3 | Status: SHIPPED | OUTPATIENT
Start: 2019-10-15 | End: 2020-11-10 | Stop reason: SDUPTHER

## 2019-10-15 RX ORDER — ROSUVASTATIN CALCIUM 40 MG/1
40 TABLET, COATED ORAL DAILY
Qty: 90 TABLET | Refills: 3 | Status: SHIPPED | OUTPATIENT
Start: 2019-10-15 | End: 2020-11-10 | Stop reason: SDUPTHER

## 2019-10-15 NOTE — PROGRESS NOTES
"Subjective   Robin Whitlock is a 54 y.o. male.     History of Present Illness   He is here for his annual fasting wellness evaluation.  He is amendable to updating his immunizations.  He voices no acute symptoms.  He continues care with his cardiologist.  He is tolerating his medications well with no adverse events.  He describes good blood pressure control.  He continues with a heart healthy diet and routine aerobic activity.  He is also needing multiple medications refilled.    Medical History  I have reviewed and updated the following portions of the patient's history: allergies, current medications, past medical history, past surgical history, past family history, and past social history.    Review of Systems   Constitutional: Negative.    HENT: Negative.    Eyes: Negative.    Respiratory: Negative.    Cardiovascular: Negative.    Gastrointestinal: Negative.    Endocrine: Negative.    Genitourinary: Negative.    Musculoskeletal: Negative.    Skin: Negative.    Allergic/Immunologic: Negative.    Neurological: Negative.    Hematological: Negative.    Psychiatric/Behavioral: Negative.        Objective   /80 (BP Location: Left arm, Patient Position: Sitting, Cuff Size: Small Adult)   Pulse 61   Temp 97.6 °F (36.4 °C) (Tympanic)   Resp 16   Ht 185.4 cm (73\")   Wt 102 kg (225 lb 12.8 oz)   SpO2 95%   BMI 29.79 kg/m²   Physical Exam   Constitutional: He is oriented to person, place, and time. He appears well-developed and well-nourished. He is cooperative.   HENT:   Head: Normocephalic and atraumatic.   Right Ear: Hearing and external ear normal.   Left Ear: Hearing and external ear normal.   Nose: Nose normal.   Mouth/Throat: Uvula is midline, oropharynx is clear and moist and mucous membranes are normal.   Eyes: Conjunctivae and EOM are normal. Pupils are equal, round, and reactive to light. No scleral icterus.   Neck: Trachea normal and normal range of motion. Neck supple. No JVD present. Carotid " bruit is not present. No thyromegaly present.   Cardiovascular: Normal rate, regular rhythm, normal heart sounds and intact distal pulses.   Pulmonary/Chest: Effort normal and breath sounds normal.   Abdominal: Soft. Bowel sounds are normal. There is no hepatosplenomegaly. There is no tenderness.   Musculoskeletal: Normal range of motion.   Lymphadenopathy:     He has no cervical adenopathy.   Neurological: He is alert and oriented to person, place, and time. He has normal strength and normal reflexes. No sensory deficit. Gait normal.   Skin: Skin is warm and dry.   Psychiatric: He has a normal mood and affect. His speech is normal and behavior is normal. Judgment and thought content normal. Cognition and memory are normal.   Nursing note and vitals reviewed.      Assessment/Plan   Diagnoses and all orders for this visit:    Healthcare maintenance  -     POC Urinalysis Dipstick, Automated  -     POC Microalbumin  -     Comprehensive Metabolic Panel  -     CBC Auto Differential  -     Lipid Panel  -     TSH  -     T4, Free  -     Uric Acid  -     C-reactive Protein  -     PSA Screen  -     HIV-1 / O / 2 Ag / Antibody 4th Generation  -     Hemoglobin A1c  -     Pneumococcal Polysaccharide Vaccine 23-Valent IM  -     Fluarix/Fluzone/Afluria Quad>6 Months  - Vaccine counseling and current VIS is provided for immunizations administered today.    The patient is here for a health maintenance visit.  Currently, the patient consumes a calorie enriched diet and has an adequate exercise regimen. Screening lab work is ordered.  Immunizations are reviewed & updated today.  Advice and education is given regarding nutrition, aerobic exercise, routine dental evaluations, routine eye exams, reproductive health, cardiovascular risk reduction, sunscreen use, self skin examination (annual dermatology evaluations) and seat belt use (general overall safety).  Further recommendations after lab evaluation.  Annual wellness evaluations  recommended.

## 2019-12-05 DIAGNOSIS — N40.1 BENIGN NON-NODULAR PROSTATIC HYPERPLASIA WITH LOWER URINARY TRACT SYMPTOMS: ICD-10-CM

## 2019-12-05 RX ORDER — TADALAFIL 5 MG/1
5 TABLET ORAL DAILY
Qty: 30 TABLET | Refills: 11 | OUTPATIENT
Start: 2019-12-05

## 2019-12-05 NOTE — TELEPHONE ENCOUNTER
This is a Dr. Arana pt. I offered an appt to establish care w/ another doctor. Pt did not want to make another appt since he was seen in October for a physical. Pt usually gets this med through [facility] because it isn't covered by his insurance and its usually a 90 day supply.

## 2020-01-03 ENCOUNTER — HOSPITAL ENCOUNTER (OUTPATIENT)
Dept: CARDIOLOGY | Facility: HOSPITAL | Age: 55
Discharge: HOME | End: 2020-01-03
Admitting: INTERNAL MEDICINE

## 2020-01-03 ENCOUNTER — HOSPITAL ENCOUNTER (OUTPATIENT)
Dept: CARDIOLOGY | Facility: HOSPITAL | Age: 55
Discharge: HOME | End: 2020-01-03

## 2020-01-03 VITALS — BODY MASS INDEX: 29.82 KG/M2 | WEIGHT: 225 LBS | HEIGHT: 73 IN

## 2020-01-03 DIAGNOSIS — E78.5 DYSLIPIDEMIA: ICD-10-CM

## 2020-01-03 DIAGNOSIS — I10 ESSENTIAL HYPERTENSION: ICD-10-CM

## 2020-01-03 DIAGNOSIS — I25.9 ISCHEMIC HEART DISEASE: ICD-10-CM

## 2020-01-03 LAB
BH CV NUCLEAR PRIOR STUDY: 3
BH CV STRESS BP STAGE 1: NORMAL
BH CV STRESS BP STAGE 2: NORMAL
BH CV STRESS BP STAGE 3: NORMAL
BH CV STRESS DURATION MIN STAGE 1: 3
BH CV STRESS DURATION MIN STAGE 2: 3
BH CV STRESS DURATION MIN STAGE 3: 1
BH CV STRESS DURATION SEC STAGE 1: 0
BH CV STRESS DURATION SEC STAGE 2: 0
BH CV STRESS DURATION SEC STAGE 3: 40
BH CV STRESS GRADE STAGE 1: 10
BH CV STRESS GRADE STAGE 2: 12
BH CV STRESS GRADE STAGE 3: 14
BH CV STRESS HR STAGE 1: 102
BH CV STRESS HR STAGE 2: 133
BH CV STRESS HR STAGE 3: 157
BH CV STRESS METS STAGE 1: 5
BH CV STRESS METS STAGE 2: 7.5
BH CV STRESS METS STAGE 3: 10
BH CV STRESS PROTOCOL 1: NORMAL
BH CV STRESS RECOVERY BP: NORMAL MMHG
BH CV STRESS RECOVERY HR: 96 BPM
BH CV STRESS SPEED STAGE 1: 1.7
BH CV STRESS SPEED STAGE 2: 2.5
BH CV STRESS SPEED STAGE 3: 3.4
BH CV STRESS STAGE 1: 1
BH CV STRESS STAGE 2: 2
BH CV STRESS STAGE 3: 3
LV EF NUC BP: 68 %
MAXIMAL PREDICTED HEART RATE: 168 BPM
PERCENT MAX PREDICTED HR: 93.45 %
STRESS BASELINE BP: NORMAL MMHG
STRESS BASELINE HR: 63 BPM
STRESS PERCENT HR: 110 %
STRESS POST ESTIMATED WORKLOAD: 9.4 METS
STRESS POST EXERCISE DUR MIN: 7 MIN
STRESS POST EXERCISE DUR SEC: 40 SEC
STRESS POST PEAK BP: NORMAL MMHG
STRESS POST PEAK HR: 157 BPM
STRESS TARGET HR: 143 BPM

## 2020-01-03 PROCEDURE — A9500 TC99M SESTAMIBI: HCPCS | Performed by: INTERNAL MEDICINE

## 2020-01-03 PROCEDURE — 78452 HT MUSCLE IMAGE SPECT MULT: CPT | Performed by: INTERNAL MEDICINE

## 2020-01-03 PROCEDURE — 93017 CV STRESS TEST TRACING ONLY: CPT

## 2020-01-03 PROCEDURE — 0 TECHNETIUM SESTAMIBI: Performed by: INTERNAL MEDICINE

## 2020-01-03 PROCEDURE — 93018 CV STRESS TEST I&R ONLY: CPT | Performed by: INTERNAL MEDICINE

## 2020-01-03 PROCEDURE — 78452 HT MUSCLE IMAGE SPECT MULT: CPT

## 2020-01-03 RX ADMIN — TECHNETIUM TC 99M SESTAMIBI 1 DOSE: 1 INJECTION INTRAVENOUS at 12:45

## 2020-01-03 RX ADMIN — TECHNETIUM TC 99M SESTAMIBI 1 DOSE: 1 INJECTION INTRAVENOUS at 10:55

## 2020-01-21 NOTE — PROGRESS NOTES
Subjective:     Encounter Date:01/22/2020    Patient ID: Robin Whitlock is a 54 y.o. 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     REFERRING PHYSICIAN: Ward Arana MD  CURRENT PHYSICIAN:  Be Calderon MD  SLEEP PHYSICIAN: Robin Acosta MD    Chief Complaint:   Chief Complaint   Patient presents with   • Ischemic heart disease   • Hypertension     Problem List:  1. Chronic ischemic heart disease:  a. Remote apparent non ST elevation myocardial infarction with abnormal cardiac enzymes and diagnostic coronary arteriography with angioplasty and stent deployment x2 - data deficit, [place], December 2010.  b. Reported acceptable stress test - data deficit, August 2011.   c. Recurrent chest pain syndrome with diagnostic coronary arteriography and angioplasty and stent deployment - data deficit, September 2012.   d. Recurrent chest pain syndrome with acceptable Cardiolite GXT (100% PMHR; 89% of predicted exercise capacity) without ischemic EKG changes, marked ectopy, anginal type chest discomfort without myocardial scar/reversible ischemia with preserved LVEF (0.62) with continued medical therapy felt warranted, October 2014.   e. Residual class I symptoms with acceptable negative quantitative SPECT gated Cardiolite exercise stress test suggestive of low probability of significant residual focal obstructive coronary artery disease with preserved systolic left ventricular function (LVEF 0.68) following exercise to 93% predicted maximum heart rate and only 76% predicted exercise capacity consistent with mild-moderate physical deconditioning, January 2020.  2. Chronic hypertension - probable essential.   3. Dyslipidemia.   4. Chronic lower tract obstructive syndrome - probable BPH.  5. Intermittent depression.   6. Chronic tobacco use: Discontinued December 2015 and restarted spring 2017, Discontinued 10/27/17.  7. Severe sleep apnea per sleep study  "(January 2018) with recommendations for CPAP.  8. Prediabetes mellitus, hemoglobin A1c 5.75%, October 2019    Allergies   Allergen Reactions   • Wasp Venom Swelling     Swarmed as a child and told to avoid   • Sulfa Antibiotics Rash       Current Outpatient Medications:   •  amLODIPine (NORVASC) 2.5 MG tablet, Take 1 tablet by mouth Daily., Disp: 90 tablet, Rfl: 3  •  aspirin 81 MG tablet, Take 81 mg by mouth daily., Disp: , Rfl:   •  cholecalciferol (VITAMIN D3) 1000 UNITS tablet, Take 2,000 Units by mouth daily., Disp: , Rfl:   •  clopidogrel (PLAVIX) 75 MG tablet, Take 1 tablet by mouth Daily., Disp: 90 tablet, Rfl: 3  •  ezetimibe (ZETIA) 10 MG tablet, Take 1 tablet by mouth Daily., Disp: 90 tablet, Rfl: 3  •  isosorbide mononitrate (IMDUR) 30 MG 24 hr tablet, Take 1 tablet by mouth Daily., Disp: 90 tablet, Rfl: 3  •  metoprolol tartrate (LOPRESSOR) 25 MG tablet, Take 1 tablet by mouth 2 (Two) Times a Day., Disp: 180 tablet, Rfl: 3  •  nitroglycerin (NITROSTAT) 0.4 MG SL tablet, Place 1 tablet under the tongue Every 5 (Five) Minutes As Needed for Chest Pain. Take no more than 3 doses in 15 minutes., Disp: 25 tablet, Rfl: 2  •  rosuvastatin (CRESTOR) 40 MG tablet, Take 1 tablet by mouth Daily., Disp: 90 tablet, Rfl: 3  •  tadalafil (CIALIS) 5 MG tablet, Take 1 tablet by mouth Daily., Disp: 30 tablet, Rfl: 11    History of Present Illness: Patient returns for followup after an 11-1/2 month hiatus.  He got his Visa renewed in May 2019, which is good for 3 years.  He is preparing for a trip to [place] and travels quite often for his job.  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  He is active on a daily basis and has no chest pain, tightness, or pressure with his activities.  The only problem he has noticed is some blood when he blows his nose first thing in the morning when he travels to places that are very cold and the \"heat is cranked up.\"  He got a flu shot in the fall.  The patient has had no interim ER " "visits, hospitalizations, serious illnesses, or surgeries.  Patient otherwise denies chest pain, shortness of breath, PND, edema, palpitations, syncope or presyncope at this time.      ROS   Obtained and negative except as outlined in problem list and HPI.    Procedures       Objective:       Vitals:    01/22/20 1017 01/22/20 1018   BP: 103/74 99/70   BP Location: Left arm Left arm   Patient Position: Sitting Standing   Pulse: 60 62   Weight: 106 kg (233 lb)    Height: 185.4 cm (73\")      Body mass index is 30.74 kg/m².   Last weight:  231 lbs.    Physical Exam   Constitutional: He is oriented to person, place, and time. He appears well-developed and well-nourished.   Neck: No JVD present. Carotid bruit is not present. No thyromegaly present.   Cardiovascular: Regular rhythm, S1 normal, S2 normal and normal heart sounds. Exam reveals no gallop, no S3 and no friction rub.   No murmur heard.  Pulses:       Dorsalis pedis pulses are 2+ on the right side, and 2+ on the left side.        Posterior tibial pulses are 2+ on the right side, and 2+ on the left side.   Pulmonary/Chest: Effort normal and breath sounds normal. He has no wheezes. He has no rhonchi. He has no rales.   Abdominal: Soft. He exhibits no mass. There is no hepatosplenomegaly. There is no tenderness. There is no guarding.   Bowel sounds audible x4   Musculoskeletal: Normal range of motion. He exhibits no edema.   Lymphadenopathy:     He has no cervical adenopathy.   Neurological: He is alert and oriented to person, place, and time.   Skin: Skin is warm, dry and intact. No rash noted.   Vitals reviewed.        Lab Review:   10/15/2019:  · PETE - 10 mg/dL  · Urinalysis - unremarkable  · HIV-1/HIV-2 - nonreactive  · PSA - 0.511  · C-reactive protein - 0.06  · Uric acid - 7.4  · Free T4 - 1.04    01/03/2020, exercise stress test:  · No chest pain or discomfort.  · No significant EKG changes; rare PVCs.  · THR of 142/minute achieved at 6:20 minutes; maximum " heart rate 157/minute ( 93% of MPHR ).  · DOMINGUEZ +24; BMI 29.7.  · Left ventricular ejection fraction is normal (calculated EF = 68%); WNL TID = 1.05.  · GI artifact is present.  · Raw images reviewed with the following abnormalities noted: mild movement at rest and post stress.  · Myocardial perfusion imaging indicates a normal myocardial perfusion study with no evidence of ischemia.  · Impressions are consistent with a low risk study.  · There is no prior study available for comparison.  · Low risk for ischemic heart disease.  · Findings consistent with a normal ECG stress test.  · Acceptable negative quantitative SPECT gated Cardiolite exercise stress test suggestive of low probability of significant residual focal obstructive coronary artery disease with preserved systolic left ventricular function (LVEF 0.68) following exercise to 93% predicted maximum heart rate and only 76% predicted exercise capacity consistent with mild-moderate physical deconditioning.    Lab Results   Component Value Date    GLUCOSE 102 (H) 10/15/2019    BUN 14 10/15/2019    CREATININE 0.95 10/15/2019    EGFRIFNONA 83 10/15/2019    BCR 14.7 10/15/2019    CO2 28.6 10/15/2019    CALCIUM 9.6 10/15/2019    ALBUMIN 4.90 10/15/2019    AST 23 10/15/2019    ALT 25 10/15/2019   Sodium - 140  Potassium - 4.6  Chloride - 100    Lab Results   Component Value Date    WBC 5.43 10/15/2019    HGB 14.5 10/15/2019    HCT 43.3 10/15/2019    MCV 95.0 10/15/2019     10/15/2019       Lab Results   Component Value Date    HGBA1C 5.75 (H) 10/15/2019       Lab Results   Component Value Date    TSH 2.430 10/15/2019       Lab Results   Component Value Date    CHOL 152 10/15/2019    CHOL 138 10/11/2018    CHOL 150 09/14/2017     Lab Results   Component Value Date    TRIG 119 10/15/2019    TRIG 132 10/11/2018    TRIG 171 (H) 09/14/2017     Lab Results   Component Value Date    HDL 52 10/15/2019    HDL 43 10/11/2018    HDL 43 09/14/2017   LDL cholesterol - 76  (10/15/2019)      Assessment:       Overall continued acceptable course with no new interim cardiopulmonary complaints with good functional status. We will defer additional diagnostic or therapeutic intervention from a cardiac perspective at this time.     Diagnosis Plan   1. Ischemic heart disease  No recurrent angina pectoris or CHF on current activity schedule; continue current treatment     2. Essential hypertension  Acceptable control; Continue current treatment.    3. Dyslipidemia  Acceptable control; continue heart healthy diet and rosuvastatin          Plan:         1. Patient to continue current medications and close follow up with the above providers.  2. Tentative cardiology follow up in early December 2020, or patient may return sooner PRN.    Transcribed by Cyndie Eubanks for Dr. Florentino Frank at 10:24 AM on 01/22/2020    I, Florentino Frank MD, [facility], personally performed the services described in this documentation as scribed by the above named individual in my presence, and it is both accurate and complete. At 10:40 AM on 01/22/2020

## 2020-01-22 ENCOUNTER — OFFICE VISIT (OUTPATIENT)
Dept: CARDIOLOGY | Facility: CLINIC | Age: 55
End: 2020-01-22

## 2020-01-22 VITALS
DIASTOLIC BLOOD PRESSURE: 70 MMHG | BODY MASS INDEX: 30.88 KG/M2 | HEIGHT: 73 IN | SYSTOLIC BLOOD PRESSURE: 99 MMHG | WEIGHT: 233 LBS | HEART RATE: 62 BPM

## 2020-01-22 DIAGNOSIS — I10 ESSENTIAL HYPERTENSION: ICD-10-CM

## 2020-01-22 DIAGNOSIS — E78.5 DYSLIPIDEMIA: ICD-10-CM

## 2020-01-22 DIAGNOSIS — I25.9 ISCHEMIC HEART DISEASE: Primary | ICD-10-CM

## 2020-01-22 PROCEDURE — 99214 OFFICE O/P EST MOD 30 MIN: CPT | Performed by: INTERNAL MEDICINE

## 2020-03-03 DIAGNOSIS — N40.1 BENIGN NON-NODULAR PROSTATIC HYPERPLASIA WITH LOWER URINARY TRACT SYMPTOMS: ICD-10-CM

## 2020-03-03 RX ORDER — TADALAFIL 5 MG/1
5 TABLET ORAL DAILY
Qty: 30 TABLET | Refills: 11 | Status: SHIPPED | OUTPATIENT
Start: 2020-03-03 | End: 2020-03-06 | Stop reason: SDUPTHER

## 2020-03-06 DIAGNOSIS — N40.1 BENIGN NON-NODULAR PROSTATIC HYPERPLASIA WITH LOWER URINARY TRACT SYMPTOMS: ICD-10-CM

## 2020-03-06 RX ORDER — TADALAFIL 5 MG/1
5 TABLET ORAL DAILY
Qty: 30 TABLET | Refills: 11 | Status: SHIPPED | OUTPATIENT
Start: 2020-03-06 | End: 2021-03-04

## 2020-03-06 NOTE — TELEPHONE ENCOUNTER
PATIENT NEEDS TO HAVE MEDICATION SENT TO [facility] IN [place]   [phone]    tadalafil (CIALIS) 5 MG tablet  90 DAY SUPPLY     PLEASE ADVISE PATIENT   .  MEDICATION WAS ORIGINALLY REQUESTED AND SENT THROUGH [facility] HOWEVER PATIENTS INSURANCE DOESNT COVER THROUGH [facility]. SO PATIENT NEEDS THIS MEDICATION REROUTED AND SENT TO [facility] IN [place]...

## 2020-03-09 ENCOUNTER — OFFICE VISIT (OUTPATIENT)
Dept: FAMILY MEDICINE CLINIC | Facility: CLINIC | Age: 55
End: 2020-03-09

## 2020-03-09 VITALS
RESPIRATION RATE: 20 BRPM | OXYGEN SATURATION: 97 % | HEIGHT: 73 IN | HEART RATE: 58 BPM | DIASTOLIC BLOOD PRESSURE: 70 MMHG | SYSTOLIC BLOOD PRESSURE: 130 MMHG | TEMPERATURE: 97.9 F | BODY MASS INDEX: 31.54 KG/M2 | WEIGHT: 238 LBS

## 2020-03-09 DIAGNOSIS — E78.2 MIXED HYPERLIPIDEMIA: Primary | ICD-10-CM

## 2020-03-09 DIAGNOSIS — I25.9 ISCHEMIC HEART DISEASE: ICD-10-CM

## 2020-03-09 DIAGNOSIS — I10 ESSENTIAL HYPERTENSION: ICD-10-CM

## 2020-03-09 PROCEDURE — 99214 OFFICE O/P EST MOD 30 MIN: CPT | Performed by: FAMILY MEDICINE

## 2020-03-09 NOTE — ASSESSMENT & PLAN NOTE
Coronary artery disease is unchanged.  Continue current treatment regimen.  Cardiac status will be reassessed in 6 months.

## 2020-03-09 NOTE — PROGRESS NOTES
Robin Whitlock is a 55 y.o. male who presents today to establish care.    Chief Complaint   Patient presents with   • Establish Care     emeric pt         Patient is here to establish care.  He is a former Dr. Agudelo patient.  He is currently being treated for hypertension, hyperlipidemia, coronary artery disease, BPH, and erectile dysfunction.  Patient takes all medications as directed.  He was recently started amlodipine 2.5 mg daily by his cardiologist.  He checks his blood pressure at home on a regular basis and states that normally his blood pressure runs in the 110s/70s.  Patient follows with cardiology regularly for his coronary artery disease.  He denies having to take nitro for chest pain the last several months.  Patient had stress test in January which showed that he was low risk for cardiac event.  Patient is tolerating statin well.  He denies myalgias, arthralgias, and fatigue.  Patient has no acute complaints today.  He denies chest pain, shortness of breath, nausea, vomiting, diarrhea, constipation, urinary symptoms, and edema.       Review of Systems   Constitutional: Negative for fever and unexpected weight loss.   HENT: Negative for congestion, ear pain and sore throat.    Eyes: Negative for visual disturbance.   Respiratory: Negative for cough, shortness of breath and wheezing.    Cardiovascular: Negative for chest pain and palpitations.   Gastrointestinal: Negative for abdominal pain, blood in stool, constipation, diarrhea, nausea, vomiting and GERD.   Endocrine: Negative for polydipsia and polyuria.   Genitourinary: Negative for difficulty urinating.   Musculoskeletal: Negative for joint swelling.   Skin: Negative for rash and skin lesions.   Allergic/Immunologic: Negative for environmental allergies.   Neurological: Negative for seizures and syncope.   Hematological: Does not bruise/bleed easily.   Psychiatric/Behavioral: Negative for suicidal ideas.        PHQ-9 Depression  Screening  Little interest or pleasure in doing things? 0   Feeling down, depressed, or hopeless? 0   Trouble falling or staying asleep, or sleeping too much?     Feeling tired or having little energy?     Poor appetite or overeating?     Feeling bad about yourself - or that you are a failure or have let yourself or your family down?     Trouble concentrating on things, such as reading the newspaper or watching television?     Moving or speaking so slowly that other people could have noticed? Or the opposite - being so fidgety or restless that you have been moving around a lot more than usual?     Thoughts that you would be better off dead, or of hurting yourself in some way?     PHQ-9 Total Score 0   If you checked off any problems, how difficult have these problems made it for you to do your work, take care of things at home, or get along with other people?         Past Medical History:   Diagnosis Date   • BPH (benign prostatic hypertrophy)    • CAD (coronary artery disease)    • Cystic acne    • ED (erectile dysfunction) of non-organic origin    • Fracture of humerus 2013   • History of myocardial infarction    • Hyperlipidemia    • [place] (obstructive sleep apnea)         Past Surgical History:   Procedure Laterality Date   • CARDIAC CATHETERIZATION  2012   • COLONOSCOPY  2017   • CORONARY STENT PLACEMENT  2012        Family History   Problem Relation Age of Onset   • Heart attack Father    • Multiple sclerosis Father    • Heart failure Father    • Lung cancer Father    • Heart failure Mother    • Heart attack Mother    • Lung cancer Mother    • No Known Problems Sister         Social History     Socioeconomic History   • Marital status: Single     Spouse name: N/A   • Number of children: 0   • Years of education: College   • Highest education level: Bachelor's degree (e.g., BA, AB, BS)   Occupational History   • Occupation: [employer]     Employer: SELF-EMPLOYED   Tobacco Use   • Smoking status: Former Smoker      "Packs/day: 0.50     Years: 27.00     Pack years: 13.50     Types: Cigarettes     Last attempt to quit: 10/27/2017     Years since quittin.3   • Smokeless tobacco: Never Used   Substance and Sexual Activity   • Alcohol use: Yes     Alcohol/week: 30.0 standard drinks     Types: 30 Cans of beer per week     Frequency: 4 or more times a week     Drinks per session: 5 or 6     Comment: often   • Drug use: No   • Sexual activity: Yes     Partners: Female     Birth control/protection: Condom     Comment: 3 female partners in the last year        Current Outpatient Medications on File Prior to Visit   Medication Sig Dispense Refill   • amLODIPine (NORVASC) 2.5 MG tablet Take 1 tablet by mouth Daily. 90 tablet 3   • aspirin 81 MG tablet Take 81 mg by mouth daily.     • cholecalciferol (VITAMIN D3) 1000 UNITS tablet Take 2,000 Units by mouth daily.     • clopidogrel (PLAVIX) 75 MG tablet Take 1 tablet by mouth Daily. 90 tablet 3   • ezetimibe (ZETIA) 10 MG tablet Take 1 tablet by mouth Daily. 90 tablet 3   • isosorbide mononitrate (IMDUR) 30 MG 24 hr tablet Take 1 tablet by mouth Daily. 90 tablet 3   • metoprolol tartrate (LOPRESSOR) 25 MG tablet Take 1 tablet by mouth 2 (Two) Times a Day. 180 tablet 3   • rosuvastatin (CRESTOR) 40 MG tablet Take 1 tablet by mouth Daily. 90 tablet 3   • tadalafil (CIALIS) 5 MG tablet Take 1 tablet by mouth Daily. 30 tablet 11   • nitroglycerin (NITROSTAT) 0.4 MG SL tablet Place 1 tablet under the tongue Every 5 (Five) Minutes As Needed for Chest Pain. Take no more than 3 doses in 15 minutes. 25 tablet 2     No current facility-administered medications on file prior to visit.        Allergies   Allergen Reactions   • Wasp Venom Swelling     Swarmed as a child and told to avoid   • Sulfa Antibiotics Rash        Visit Vitals  /70   Pulse 58   Temp 97.9 °F (36.6 °C) (Temporal)   Resp 20   Ht 185.4 cm (73\")   Wt 108 kg (238 lb)   SpO2 97%   BMI 31.40 kg/m²      Body mass index is 31.4 " kg/m².    Physical Exam   Constitutional: He is oriented to person, place, and time. He appears well-developed and well-nourished. No distress.   HENT:   Head: Atraumatic.   Eyes: EOM are normal.   Neck: Normal range of motion. Neck supple.   Cardiovascular: Normal rate, regular rhythm, normal heart sounds and intact distal pulses. Exam reveals no gallop and no friction rub.   No murmur heard.  Pulmonary/Chest: Effort normal and breath sounds normal. No stridor. No respiratory distress. He has no wheezes. He has no rales.   Musculoskeletal: He exhibits no edema.   Neurological: He is alert and oriented to person, place, and time.   Skin: Skin is warm and dry. He is not diaphoretic.   Psychiatric: He has a normal mood and affect. His behavior is normal.             Problems Addressed this Visit        Cardiovascular and Mediastinum    Hyperlipidemia - Primary     Lipid abnormalities are unchanged.  Pharmacotherapy as ordered.  Lipids will be reassessed in 6 months.         Essential hypertension     Hypertension is unchanged.  Continue current treatment regimen.  Blood pressure will be reassessed at the next regular appointment.         Ischemic heart disease     Coronary artery disease is unchanged.  Continue current treatment regimen.  Cardiac status will be reassessed in 6 months.               Return in about 32 weeks (around 10/19/2020) for Annual.    Parts of this office note have been dictated by voice recognition software. Grammatical and/or spelling errors may be present.     Be Calderon MD  3/9/2020

## 2020-10-20 ENCOUNTER — OFFICE VISIT (OUTPATIENT)
Dept: FAMILY MEDICINE CLINIC | Facility: CLINIC | Age: 55
End: 2020-10-20

## 2020-10-20 VITALS
TEMPERATURE: 97.8 F | OXYGEN SATURATION: 99 % | HEART RATE: 54 BPM | SYSTOLIC BLOOD PRESSURE: 118 MMHG | BODY MASS INDEX: 31.81 KG/M2 | DIASTOLIC BLOOD PRESSURE: 70 MMHG | WEIGHT: 240 LBS | RESPIRATION RATE: 16 BRPM | HEIGHT: 73 IN

## 2020-10-20 DIAGNOSIS — Z12.5 SCREENING FOR PROSTATE CANCER: ICD-10-CM

## 2020-10-20 DIAGNOSIS — Z52.008 PLASMA DONOR: ICD-10-CM

## 2020-10-20 DIAGNOSIS — Z00.00 WELL ADULT EXAM: Primary | ICD-10-CM

## 2020-10-20 PROCEDURE — 90686 IIV4 VACC NO PRSV 0.5 ML IM: CPT | Performed by: FAMILY MEDICINE

## 2020-10-20 PROCEDURE — G0103 PSA SCREENING: HCPCS | Performed by: FAMILY MEDICINE

## 2020-10-20 PROCEDURE — 80053 COMPREHEN METABOLIC PANEL: CPT | Performed by: FAMILY MEDICINE

## 2020-10-20 PROCEDURE — 85025 COMPLETE CBC W/AUTO DIFF WBC: CPT | Performed by: FAMILY MEDICINE

## 2020-10-20 PROCEDURE — 84443 ASSAY THYROID STIM HORMONE: CPT | Performed by: FAMILY MEDICINE

## 2020-10-20 PROCEDURE — 86769 SARS-COV-2 COVID-19 ANTIBODY: CPT | Performed by: FAMILY MEDICINE

## 2020-10-20 PROCEDURE — 83036 HEMOGLOBIN GLYCOSYLATED A1C: CPT | Performed by: FAMILY MEDICINE

## 2020-10-20 PROCEDURE — 36415 COLL VENOUS BLD VENIPUNCTURE: CPT | Performed by: FAMILY MEDICINE

## 2020-10-20 PROCEDURE — 90471 IMMUNIZATION ADMIN: CPT | Performed by: FAMILY MEDICINE

## 2020-10-20 PROCEDURE — 80061 LIPID PANEL: CPT | Performed by: FAMILY MEDICINE

## 2020-10-20 PROCEDURE — 99396 PREV VISIT EST AGE 40-64: CPT | Performed by: FAMILY MEDICINE

## 2020-10-20 NOTE — PROGRESS NOTES
Robin Whitlock is a 55 y.o. male who presents today for an annual exam.    Chief Complaint   Patient presents with   • Annual Exam        Patient presents today for an annual exam and has no acute complaints. He does not follow with optometry or dermatology. He does follow with a dentist. He reports a healthy and well-balanced diet for the most part, but he travels a lot for work and finds that he drinks more beer on the road. He reports walking roughly 7 miles per day for exercise. He had a colonoscopy 2-3 years ago and there were no significant findings. Patient would like a flu shot today. He is also interested in being tested for antibodies to COVID-19 because he travels a lot and is curious to see if he ever had it.       Review of Systems   Constitutional: Negative for fever and unexpected weight loss.   HENT: Positive for congestion. Negative for sinus pressure.    Eyes: Negative for visual disturbance.   Respiratory: Negative for cough and shortness of breath.    Cardiovascular: Negative for chest pain, palpitations and leg swelling.   Gastrointestinal: Negative for constipation, diarrhea, nausea, vomiting and GERD.   Genitourinary: Negative for difficulty urinating and dysuria.   Musculoskeletal: Negative for joint swelling.   Skin: Negative for color change and rash.   Allergic/Immunologic: Positive for environmental allergies.   Neurological: Negative for dizziness, numbness and headache.   Psychiatric/Behavioral: Negative for suicidal ideas and depressed mood. The patient is not nervous/anxious.         PHQ-9 Depression Screening  Little interest or pleasure in doing things? 0   Feeling down, depressed, or hopeless? 0   Trouble falling or staying asleep, or sleeping too much?     Feeling tired or having little energy?     Poor appetite or overeating?     Feeling bad about yourself - or that you are a failure or have let yourself or your family down?     Trouble concentrating on things, such as  reading the newspaper or watching television?     Moving or speaking so slowly that other people could have noticed? Or the opposite - being so fidgety or restless that you have been moving around a lot more than usual?     Thoughts that you would be better off dead, or of hurting yourself in some way?     PHQ-9 Total Score 0   If you checked off any problems, how difficult have these problems made it for you to do your work, take care of things at home, or get along with other people?         Past Medical History:   Diagnosis Date   • BPH (benign prostatic hypertrophy)    • CAD (coronary artery disease)    • Cystic acne    • ED (erectile dysfunction) of non-organic origin    • History of myocardial infarction    • Hyperlipidemia    • [place] (obstructive sleep apnea)         Past Surgical History:   Procedure Laterality Date   • CARDIAC CATHETERIZATION     • COLONOSCOPY     • CORONARY STENT PLACEMENT          Family History   Problem Relation Age of Onset   • Heart attack Father    • Multiple sclerosis Father    • Heart failure Father    • Lung cancer Father    • Heart failure Mother    • Heart attack Mother    • Lung cancer Mother    • No Known Problems Sister         Social History     Socioeconomic History   • Marital status:      Spouse name: N/A   • Number of children: 0   • Years of education: College   • Highest education level: Bachelor's degree (e.g., BA, AB, BS)   Occupational History   • Occupation: [employer]     Employer: SELF-EMPLOYED   Tobacco Use   • Smoking status: Former Smoker     Packs/day: 0.50     Years: 27.00     Pack years: 13.50     Types: Cigarettes     Quit date: 10/27/2017     Years since quittin.9   • Smokeless tobacco: Never Used   Substance and Sexual Activity   • Alcohol use: Yes     Alcohol/week: 30.0 standard drinks     Types: 30 Cans of beer per week     Frequency: 4 or more times a week     Drinks per session: 5 or 6     Comment: often   • Drug use: No   • Sexual  "activity: Yes     Partners: Female     Birth control/protection: Condom     Comment: wife only        Current Outpatient Medications on File Prior to Visit   Medication Sig Dispense Refill   • amLODIPine (NORVASC) 2.5 MG tablet Take 1 tablet by mouth Daily. 90 tablet 3   • aspirin 81 MG tablet Take 81 mg by mouth daily.     • cholecalciferol (VITAMIN D3) 1000 UNITS tablet Take 2,000 Units by mouth daily.     • clopidogrel (PLAVIX) 75 MG tablet Take 1 tablet by mouth Daily. 90 tablet 3   • ezetimibe (ZETIA) 10 MG tablet Take 1 tablet by mouth Daily. 90 tablet 3   • isosorbide mononitrate (IMDUR) 30 MG 24 hr tablet Take 1 tablet by mouth Daily. 90 tablet 3   • metoprolol tartrate (LOPRESSOR) 25 MG tablet Take 1 tablet by mouth 2 (Two) Times a Day. 180 tablet 3   • nitroglycerin (NITROSTAT) 0.4 MG SL tablet Place 1 tablet under the tongue Every 5 (Five) Minutes As Needed for Chest Pain. Take no more than 3 doses in 15 minutes. 25 tablet 2   • rosuvastatin (CRESTOR) 40 MG tablet Take 1 tablet by mouth Daily. 90 tablet 3   • tadalafil (CIALIS) 5 MG tablet Take 1 tablet by mouth Daily. 30 tablet 11     No current facility-administered medications on file prior to visit.        Allergies   Allergen Reactions   • Wasp Venom Swelling     Swarmed as a child and told to avoid   • Sulfa Antibiotics Rash        Visit Vitals  /70   Pulse 54   Temp 97.8 °F (36.6 °C) (Temporal)   Resp 16   Ht 185.4 cm (72.99\")   Wt 109 kg (240 lb)   SpO2 99%   BMI 31.67 kg/m²      Body mass index is 31.67 kg/m².    Physical Exam  Constitutional:       General: He is not in acute distress.     Appearance: He is well-developed. He is not diaphoretic.   HENT:      Head: Atraumatic.      Right Ear: External ear normal.      Left Ear: External ear normal.   Eyes:      Extraocular Movements: Extraocular movements intact.      Conjunctiva/sclera: Conjunctivae normal.   Neck:      Musculoskeletal: Normal range of motion and neck supple. "   Cardiovascular:      Rate and Rhythm: Normal rate and regular rhythm.      Pulses: Normal pulses.      Heart sounds: Normal heart sounds. No murmur. No friction rub. No gallop.    Pulmonary:      Effort: Pulmonary effort is normal. No respiratory distress.      Breath sounds: Normal breath sounds. No wheezing or rales.   Abdominal:      General: Bowel sounds are normal. There is no distension.      Palpations: Abdomen is soft.      Tenderness: There is no abdominal tenderness.   Musculoskeletal:         General: No swelling or tenderness.   Skin:     General: Skin is warm and dry.   Neurological:      Mental Status: He is alert and oriented to person, place, and time.   Psychiatric:         Behavior: Behavior normal.               Problems Addressed this Visit        Other    Well adult exam - Primary     The patient is here for health maintenance visit.  Currently, the patient consumes a healthy diet and has an adequate exercise regimen.  Screening lab work is ordered.  Immunizations were reviewed today.  Advice and education was given regarding nutrition, aerobic exercise, routine dental evaluations, routine eye exams, reproductive health, cardiovascular risk reduction, sunscreen use, self skin examination (annual dermatology evaluations) and seatbelt use (general overall safety).  Further recommendations will be given if needed after lab evaluation.  Annual wellness evaluation is recommended.           Relevant Orders    Lipid panel    Hemoglobin A1c    CBC w AUTO Differential    Comprehensive metabolic panel    TSH    PSA Screen    FluLaval Quad >6 Months (7670-2381)    SARS-CoV-2 Antibodies (Roche)    Screening for prostate cancer    Relevant Orders    PSA Screen      Other Visit Diagnoses     Plasma donor        Relevant Orders    SARS-CoV-2 Antibodies (Roche)      Diagnoses       Codes Comments    Well adult exam    -  Primary ICD-10-CM: Z00.00  ICD-9-CM: V70.0     Screening for prostate cancer      ICD-10-CM: Z12.5  ICD-9-CM: V76.44     Plasma donor     ICD-10-CM: Z52.008  ICD-9-CM: V59.09           Return in about 1 year (around 10/20/2021) for Annual.    Parts of this office note have been dictated by voice recognition software. Grammatical and/or spelling errors may be present.     Be Calderon MD  10/20/2020

## 2020-10-20 NOTE — PATIENT INSTRUCTIONS
Preventive Care 40-64 Years Old, Male  Preventive care refers to lifestyle choices and visits with your health care provider that can promote health and wellness. This includes:  · A yearly physical exam. This is also called an annual well check.  · Regular dental and eye exams.  · Immunizations.  · Screening for certain conditions.  · Healthy lifestyle choices, such as eating a healthy diet, getting regular exercise, not using drugs or products that contain nicotine and tobacco, and limiting alcohol use.  What can I expect for my preventive care visit?  Physical exam  Your health care provider will check:  · Height and weight. These may be used to calculate body mass index (BMI), which is a measurement that tells if you are at a healthy weight.  · Heart rate and blood pressure.  · Your skin for abnormal spots.  Counseling  Your health care provider may ask you questions about:  · Alcohol, tobacco, and drug use.  · Emotional well-being.  · Home and relationship well-being.  · Sexual activity.  · Eating habits.  · Work and work environment.  What immunizations do I need?    Influenza (flu) vaccine  · This is recommended every year.  Tetanus, diphtheria, and pertussis (Tdap) vaccine  · You may need a Td booster every 10 years.  Varicella (chickenpox) vaccine  · You may need this vaccine if you have not already been vaccinated.  Zoster (shingles) vaccine  · You may need this after age 60.  Measles, mumps, and rubella (MMR) vaccine  · You may need at least one dose of MMR if you were born in 1957 or later. You may also need a second dose.  Pneumococcal conjugate (PCV13) vaccine  · You may need this if you have certain conditions and were not previously vaccinated.  Pneumococcal polysaccharide (PPSV23) vaccine  · You may need one or two doses if you smoke cigarettes or if you have certain conditions.  Meningococcal conjugate (MenACWY) vaccine  · You may need this if you have certain conditions.  Hepatitis A  vaccine  · You may need this if you have certain conditions or if you travel or work in places where you may be exposed to hepatitis A.  Hepatitis B vaccine  · You may need this if you have certain conditions or if you travel or work in places where you may be exposed to hepatitis B.  Haemophilus influenzae type b (Hib) vaccine  · You may need this if you have certain risk factors.  Human papillomavirus (HPV) vaccine  · If recommended by your health care provider, you may need three doses over 6 months.  You may receive vaccines as individual doses or as more than one vaccine together in one shot (combination vaccines). Talk with your health care provider about the risks and benefits of combination vaccines.  What tests do I need?  Blood tests  · Lipid and cholesterol levels. These may be checked every 5 years, or more frequently if you are over 50 years old.  · Hepatitis C test.  · Hepatitis B test.  Screening  · Lung cancer screening. You may have this screening every year starting at age 55 if you have a 30-pack-year history of smoking and currently smoke or have quit within the past 15 years.  · Prostate cancer screening. Recommendations will vary depending on your family history and other risks.  · Colorectal cancer screening. All adults should have this screening starting at age 50 and continuing until age 75. Your health care provider may recommend screening at age 45 if you are at increased risk. You will have tests every 1-10 years, depending on your results and the type of screening test.  · Diabetes screening. This is done by checking your blood sugar (glucose) after you have not eaten for a while (fasting). You may have this done every 1-3 years.  · Sexually transmitted disease (STD) testing.  Follow these instructions at home:  Eating and drinking  · Eat a diet that includes fresh fruits and vegetables, whole grains, lean protein, and low-fat dairy products.  · Take vitamin and mineral supplements as  recommended by your health care provider.  · Do not drink alcohol if your health care provider tells you not to drink.  · If you drink alcohol:  ? Limit how much you have to 0-2 drinks a day.  ? Be aware of how much alcohol is in your drink. In the [place] one drink equals one 12 oz bottle of beer (355 mL), one 5 oz glass of wine (148 mL), or one 1½ oz glass of hard liquor (44 mL).  Lifestyle  · Take daily care of your teeth and gums.  · Stay active. Exercise for at least 30 minutes on 5 or more days each week.  · Do not use any products that contain nicotine or tobacco, such as cigarettes, e-cigarettes, and chewing tobacco. If you need help quitting, ask your health care provider.  · If you are sexually active, practice safe sex. Use a condom or other form of protection to prevent STIs (sexually transmitted infections).  · Talk with your health care provider about taking a low-dose aspirin every day starting at age 50.  What's next?  · Go to your health care provider once a year for a well check visit.  · Ask your health care provider how often you should have your eyes and teeth checked.  · Stay up to date on all vaccines.  This information is not intended to replace advice given to you by your health care provider. Make sure you discuss any questions you have with your health care provider.  Document Released: 01/13/2017 Document Revised: 12/12/2019 Document Reviewed: 12/12/2019  [employer] Patient Education © 2020 [employer]

## 2020-10-21 LAB
ALBUMIN SERPL-MCNC: 4.4 G/DL (ref 3.5–5.2)
ALBUMIN/GLOB SERPL: 1.5 G/DL
ALP SERPL-CCNC: 69 U/L (ref 39–117)
ALT SERPL W P-5'-P-CCNC: 43 U/L (ref 1–41)
ANION GAP SERPL CALCULATED.3IONS-SCNC: 8 MMOL/L (ref 5–15)
AST SERPL-CCNC: 41 U/L (ref 1–40)
BASOPHILS # BLD AUTO: 0.08 10*3/MM3 (ref 0–0.2)
BASOPHILS NFR BLD AUTO: 1.5 % (ref 0–1.5)
BILIRUB SERPL-MCNC: 0.5 MG/DL (ref 0–1.2)
BUN SERPL-MCNC: 15 MG/DL (ref 6–20)
BUN/CREAT SERPL: 16.1 (ref 7–25)
CALCIUM SPEC-SCNC: 9.1 MG/DL (ref 8.6–10.5)
CHLORIDE SERPL-SCNC: 103 MMOL/L (ref 98–107)
CHOLEST SERPL-MCNC: 155 MG/DL (ref 0–200)
CO2 SERPL-SCNC: 27 MMOL/L (ref 22–29)
CREAT SERPL-MCNC: 0.93 MG/DL (ref 0.76–1.27)
DEPRECATED RDW RBC AUTO: 45.5 FL (ref 37–54)
EOSINOPHIL # BLD AUTO: 0.19 10*3/MM3 (ref 0–0.4)
EOSINOPHIL NFR BLD AUTO: 3.6 % (ref 0.3–6.2)
ERYTHROCYTE [DISTWIDTH] IN BLOOD BY AUTOMATED COUNT: 12.7 % (ref 12.3–15.4)
GFR SERPL CREATININE-BSD FRML MDRD: 85 ML/MIN/1.73
GLOBULIN UR ELPH-MCNC: 2.9 GM/DL
GLUCOSE SERPL-MCNC: 100 MG/DL (ref 65–99)
HBA1C MFR BLD: 5.4 % (ref 4.8–5.6)
HCT VFR BLD AUTO: 45.1 % (ref 37.5–51)
HDLC SERPL-MCNC: 54 MG/DL (ref 40–60)
HGB BLD-MCNC: 15.2 G/DL (ref 13–17.7)
IMM GRANULOCYTES # BLD AUTO: 0.01 10*3/MM3 (ref 0–0.05)
IMM GRANULOCYTES NFR BLD AUTO: 0.2 % (ref 0–0.5)
LDLC SERPL CALC-MCNC: 80 MG/DL (ref 0–100)
LDLC/HDLC SERPL: 1.43 {RATIO}
LYMPHOCYTES # BLD AUTO: 1.22 10*3/MM3 (ref 0.7–3.1)
LYMPHOCYTES NFR BLD AUTO: 22.8 % (ref 19.6–45.3)
MCH RBC QN AUTO: 32.8 PG (ref 26.6–33)
MCHC RBC AUTO-ENTMCNC: 33.7 G/DL (ref 31.5–35.7)
MCV RBC AUTO: 97.4 FL (ref 79–97)
MONOCYTES # BLD AUTO: 0.66 10*3/MM3 (ref 0.1–0.9)
MONOCYTES NFR BLD AUTO: 12.4 % (ref 5–12)
NEUTROPHILS NFR BLD AUTO: 3.18 10*3/MM3 (ref 1.7–7)
NEUTROPHILS NFR BLD AUTO: 59.5 % (ref 42.7–76)
NRBC BLD AUTO-RTO: 0 /100 WBC (ref 0–0.2)
PLATELET # BLD AUTO: 150 10*3/MM3 (ref 140–450)
PMV BLD AUTO: 11.3 FL (ref 6–12)
POTASSIUM SERPL-SCNC: 4.7 MMOL/L (ref 3.5–5.2)
PROT SERPL-MCNC: 7.3 G/DL (ref 6–8.5)
PSA SERPL-MCNC: 0.52 NG/ML (ref 0–4)
RBC # BLD AUTO: 4.63 10*6/MM3 (ref 4.14–5.8)
SODIUM SERPL-SCNC: 138 MMOL/L (ref 136–145)
TRIGL SERPL-MCNC: 119 MG/DL (ref 0–150)
TSH SERPL DL<=0.05 MIU/L-ACNC: 3.1 UIU/ML (ref 0.27–4.2)
VLDLC SERPL-MCNC: 21 MG/DL (ref 5–40)
WBC # BLD AUTO: 5.34 10*3/MM3 (ref 3.4–10.8)

## 2020-10-23 LAB — SARS-COV-2 AB SERPL QL IA: NEGATIVE

## 2020-11-10 DIAGNOSIS — I25.10 CORONARY ARTERY DISEASE DUE TO LIPID RICH PLAQUE: ICD-10-CM

## 2020-11-10 DIAGNOSIS — I25.83 CORONARY ARTERY DISEASE DUE TO LIPID RICH PLAQUE: ICD-10-CM

## 2020-11-10 RX ORDER — ROSUVASTATIN CALCIUM 40 MG/1
40 TABLET, COATED ORAL DAILY
Qty: 90 TABLET | Refills: 3 | Status: SHIPPED | OUTPATIENT
Start: 2020-11-10 | End: 2021-07-27 | Stop reason: SDUPTHER

## 2020-11-10 RX ORDER — ISOSORBIDE MONONITRATE 30 MG/1
30 TABLET, EXTENDED RELEASE ORAL DAILY
Qty: 90 TABLET | Refills: 3 | Status: SHIPPED | OUTPATIENT
Start: 2020-11-10 | End: 2021-07-27 | Stop reason: SDUPTHER

## 2020-11-10 RX ORDER — AMLODIPINE BESYLATE 2.5 MG/1
2.5 TABLET ORAL DAILY
Qty: 90 TABLET | Refills: 3 | Status: SHIPPED | OUTPATIENT
Start: 2020-11-10 | End: 2021-07-27 | Stop reason: SDUPTHER

## 2020-11-10 RX ORDER — CLOPIDOGREL BISULFATE 75 MG/1
75 TABLET ORAL DAILY
Qty: 90 TABLET | Refills: 3 | Status: SHIPPED | OUTPATIENT
Start: 2020-11-10 | End: 2021-07-27 | Stop reason: SDUPTHER

## 2020-11-10 RX ORDER — EZETIMIBE 10 MG/1
10 TABLET ORAL DAILY
Qty: 90 TABLET | Refills: 3 | Status: SHIPPED | OUTPATIENT
Start: 2020-11-10 | End: 2021-07-27 | Stop reason: SDUPTHER

## 2020-12-11 ENCOUNTER — OFFICE VISIT (OUTPATIENT)
Dept: CARDIOLOGY | Facility: CLINIC | Age: 55
End: 2020-12-11

## 2020-12-11 VITALS
SYSTOLIC BLOOD PRESSURE: 121 MMHG | BODY MASS INDEX: 31.91 KG/M2 | WEIGHT: 240.8 LBS | DIASTOLIC BLOOD PRESSURE: 85 MMHG | OXYGEN SATURATION: 96 % | HEART RATE: 63 BPM | HEIGHT: 73 IN

## 2020-12-11 DIAGNOSIS — I10 ESSENTIAL HYPERTENSION: ICD-10-CM

## 2020-12-11 DIAGNOSIS — I25.9 ISCHEMIC HEART DISEASE: Primary | ICD-10-CM

## 2020-12-11 DIAGNOSIS — E78.5 DYSLIPIDEMIA: ICD-10-CM

## 2020-12-11 PROCEDURE — 99214 OFFICE O/P EST MOD 30 MIN: CPT | Performed by: INTERNAL MEDICINE

## 2020-12-11 NOTE — PROGRESS NOTES
Subjective:     Encounter Date:12/11/2020    Patient ID: Robin Whitlock is a 55 y.o.  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     REFERRING PHYSICIAN: Ward Arana MD  CURRENT PHYSICIAN:  Be Calderon MD  SLEEP PHYSICIAN: Robin Acosta MD, [facility]    Chief Complaint:   Chief Complaint   Patient presents with   • Ischemic heart disease       Problem List:  1. Chronic ischemic heart disease:  a. Remote apparent non ST elevation myocardial infarction with abnormal cardiac enzymes and diagnostic coronary arteriography with angioplasty and stent deployment x2 - data deficit, [place], December 2010.  b. Reported acceptable stress test - data deficit, August 2011.   c. Recurrent chest pain syndrome with diagnostic coronary arteriography and angioplasty and stent deployment - data deficit, September 2012.   d. Recurrent chest pain syndrome with acceptable Cardiolite GXT (100% PMHR; 89% of predicted exercise capacity) without ischemic EKG changes, marked ectopy, anginal type chest discomfort without myocardial scar/reversible ischemia with preserved LVEF (0.62) with continued medical therapy felt warranted, October 2014.   e. Residual class I symptoms with acceptable negative quantitative SPECT gated Cardiolite exercise stress test suggestive of low probability of significant residual focal obstructive coronary artery disease with preserved systolic left ventricular function (LVEF 0.68) following exercise to 93% predicted maximum heart rate and only 76% predicted exercise capacity consistent with mild-moderate physical deconditioning, January 2020.  f. Residual class I symptoms, December 2020  2. Chronic hypertension - probable essential.   3. Dyslipidemia.   4. Chronic lower tract obstructive syndrome - probable BPH.  5. Intermittent depression.   6. Chronic tobacco use: Discontinued December 2015 and restarted spring 2017, Discontinued  10/27/17.  7. Severe sleep apnea per sleep study (January 2018) with recommendations for CPAP.  8. Prediabetes mellitus, hemoglobin A1c 5.75%, October 2019    Allergies   Allergen Reactions   • Wasp Venom Swelling     Swarmed as a child and told to avoid   • Sulfa Antibiotics Rash       Current Outpatient Medications:   •  amLODIPine (NORVASC) 2.5 MG tablet, Take 1 tablet by mouth Daily., Disp: 90 tablet, Rfl: 3  •  aspirin 81 MG tablet, Take 81 mg by mouth daily., Disp: , Rfl:   •  cholecalciferol (VITAMIN D3) 1000 UNITS tablet, Take 2,000 Units by mouth daily., Disp: , Rfl:   •  clopidogrel (PLAVIX) 75 MG tablet, Take 1 tablet by mouth Daily., Disp: 90 tablet, Rfl: 3  •  ezetimibe (ZETIA) 10 MG tablet, Take 1 tablet by mouth Daily., Disp: 90 tablet, Rfl: 3  •  isosorbide mononitrate (IMDUR) 30 MG 24 hr tablet, Take 1 tablet by mouth Daily., Disp: 90 tablet, Rfl: 3  •  metoprolol tartrate (LOPRESSOR) 25 MG tablet, Take 1 tablet by mouth 2 (Two) Times a Day., Disp: 180 tablet, Rfl: 3  •  nitroglycerin (NITROSTAT) 0.4 MG SL tablet, Place 1 tablet under the tongue Every 5 (Five) Minutes As Needed for Chest Pain. Take no more than 3 doses in 15 minutes., Disp: 25 tablet, Rfl: 2  •  rosuvastatin (CRESTOR) 40 MG tablet, Take 1 tablet by mouth Daily., Disp: 90 tablet, Rfl: 3  •  tadalafil (CIALIS) 5 MG tablet, Take 1 tablet by mouth Daily., Disp: 30 tablet, Rfl: 11    History of Present Illness: Patient returns for scheduled 11-month follow up. Since last visit, patient has been doing well overall and has continued to work. In the beginning of the pandemic he wasn't traveling for work, then started to have to drive everywhere but is back to flying as usual. He has been active without cardiopulmonary complaints. He will not be returning to [place] for allison this year. He has had no interim ER visits, hospitalizations, serious illnesses, or surgeries. He has remained tobacco free. Patient otherwise denies chest pain,  "shortness of breath, PND, edema, palpitations, syncope, or presyncope at this time. He received influenza immunization.       ROS   Obtained and negative except as outlined in problem list and HPI.    Procedures       Objective:       Vitals:    12/11/20 1554 12/11/20 1555   BP: 117/80 121/85   BP Location: Left arm Left arm   Patient Position: Sitting Standing   Pulse: 61 63   SpO2: 96%    Weight: 109 kg (240 lb 12.8 oz)    Height: 185.4 cm (73\")      Body mass index is 31.77 kg/m².  Last weight: 233 lbs    Vitals signs reviewed.   Constitutional:       Appearance: Well-developed.   Neck:      Thyroid: No thyromegaly.      Vascular: No carotid bruit or JVD.      Lymphadenopathy: No cervical adenopathy.   Pulmonary:      Effort: Pulmonary effort is normal.      Breath sounds: Normal breath sounds. No wheezing. No rhonchi. No rales.   Cardiovascular:      Regular rhythm.      No gallop. No S3 gallop.   Pulses:     Dorsalis pedis: 2+ bilaterally.     Posterior tibial: 2+ bilaterally.  Edema:     Peripheral edema absent.   Abdominal:      Palpations: Abdomen is soft. There is no abdominal mass.      Tenderness: There is no abdominal tenderness. There is no guarding.   Musculoskeletal: Normal range of motion.   Skin:     General: Skin is warm and dry.      Findings: No rash.   Neurological:      Mental Status: Alert and oriented to person, place, and time.           Lab Review:   Reviewed with patient by letter:  Lab Results   Component Value Date    GLUCOSE 100 (H) 10/20/2020    BUN 15 10/20/2020    CREATININE 0.93 10/20/2020    EGFRIFNONA 85 10/20/2020    BCR 16.1 10/20/2020     10/20/2020    K 4.7 10/20/2020     10/20/2020    CO2 27.0 10/20/2020    CALCIUM 9.1 10/20/2020    ALBUMIN 4.40 10/20/2020    AST 41 (H) 10/20/2020    ALT 43 (H) 10/20/2020       Lab Results   Component Value Date    WBC 5.34 10/20/2020    HGB 15.2 10/20/2020    HCT 45.1 10/20/2020    MCV 97.4 (H) 10/20/2020     10/20/2020 "       Lab Results   Component Value Date    HGBA1C 5.40 10/20/2020       Lab Results   Component Value Date    TSH 3.100 10/20/2020       Lab Results   Component Value Date    CHOL 155 10/20/2020    CHOL 152 10/15/2019    CHOL 138 10/11/2018     Lab Results   Component Value Date    TRIG 119 10/20/2020    TRIG 119 10/15/2019    TRIG 132 10/11/2018     Lab Results   Component Value Date    HDL 54 10/20/2020    HDL 52 10/15/2019    HDL 43 10/11/2018     Lab Results   Component Value Date    LDL 80 10/20/2020    LDL 76 10/15/2019    LDL 82 10/11/2018             Assessment:       Overall continued acceptable course with no new interim cardiopulmonary complaints with good functional status. We will defer additional diagnostic or therapeutic intervention from a cardiac perspective at this time.     Diagnosis Plan   1. Ischemic heart disease  No recurrent angina pectoris or CHF on current activity schedule; continue current treatment    2. Essential hypertension  Acceptable control; Continue current treatment.    3. Dyslipidemia  Excellent FLP, October 2020; continue rosuvastatin          Plan:         1. Patient to continue current medications and close follow up with the above providers.  2. Tentative cardiology follow up in September 2021 or patient may return sooner PRN.    Scribed for Florentino Frank MD by Vanessa Eisenberg. 12/11/2020  16:06 EST     I, Florentino Frank MD, [facility], personally performed the services described in this documentation as scribed by the above named individual in my presence, and it is both accurate and complete. At 16:07 EST on 12/11/2020

## 2021-03-04 DIAGNOSIS — N40.1 BENIGN NON-NODULAR PROSTATIC HYPERPLASIA WITH LOWER URINARY TRACT SYMPTOMS: ICD-10-CM

## 2021-03-04 RX ORDER — TADALAFIL 5 MG/1
TABLET ORAL
Qty: 90 TABLET | Refills: 10 | Status: SHIPPED | OUTPATIENT
Start: 2021-03-04 | End: 2022-04-16 | Stop reason: SDUPTHER

## 2021-07-27 DIAGNOSIS — I25.83 CORONARY ARTERY DISEASE DUE TO LIPID RICH PLAQUE: ICD-10-CM

## 2021-07-27 DIAGNOSIS — I25.10 CORONARY ARTERY DISEASE DUE TO LIPID RICH PLAQUE: ICD-10-CM

## 2021-07-27 RX ORDER — EZETIMIBE 10 MG/1
10 TABLET ORAL DAILY
Qty: 90 TABLET | Refills: 3 | Status: SHIPPED | OUTPATIENT
Start: 2021-07-27 | End: 2022-07-22

## 2021-07-27 RX ORDER — ROSUVASTATIN CALCIUM 40 MG/1
40 TABLET, COATED ORAL DAILY
Qty: 90 TABLET | Refills: 3 | Status: SHIPPED | OUTPATIENT
Start: 2021-07-27 | End: 2022-07-22

## 2021-07-27 RX ORDER — AMLODIPINE BESYLATE 2.5 MG/1
2.5 TABLET ORAL DAILY
Qty: 90 TABLET | Refills: 3 | Status: SHIPPED | OUTPATIENT
Start: 2021-07-27 | End: 2022-07-22

## 2021-07-27 RX ORDER — ISOSORBIDE MONONITRATE 30 MG/1
30 TABLET, EXTENDED RELEASE ORAL DAILY
Qty: 90 TABLET | Refills: 3 | Status: SHIPPED | OUTPATIENT
Start: 2021-07-27 | End: 2022-07-22

## 2021-07-27 RX ORDER — CLOPIDOGREL BISULFATE 75 MG/1
75 TABLET ORAL DAILY
Qty: 90 TABLET | Refills: 3 | Status: SHIPPED | OUTPATIENT
Start: 2021-07-27 | End: 2022-07-22

## 2021-09-24 ENCOUNTER — OFFICE VISIT (OUTPATIENT)
Dept: CARDIOLOGY | Facility: CLINIC | Age: 56
End: 2021-09-24

## 2021-09-24 VITALS
WEIGHT: 248.8 LBS | OXYGEN SATURATION: 96 % | DIASTOLIC BLOOD PRESSURE: 92 MMHG | BODY MASS INDEX: 32.97 KG/M2 | HEIGHT: 73 IN | HEART RATE: 60 BPM | SYSTOLIC BLOOD PRESSURE: 141 MMHG

## 2021-09-24 DIAGNOSIS — I10 ESSENTIAL HYPERTENSION: ICD-10-CM

## 2021-09-24 DIAGNOSIS — I25.9 ISCHEMIC HEART DISEASE: Primary | ICD-10-CM

## 2021-09-24 DIAGNOSIS — E78.5 DYSLIPIDEMIA: ICD-10-CM

## 2021-09-24 PROCEDURE — 99214 OFFICE O/P EST MOD 30 MIN: CPT | Performed by: INTERNAL MEDICINE

## 2021-09-24 NOTE — PROGRESS NOTES
Subjective:     Encounter Date:09/24/2021    Patient ID: Robin Whitlock is a 56 y.o.  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     REFERRING PHYSICIAN: Ward Arana MD  CURRENT PHYSICIAN:  Be Calderon MD  SLEEP PHYSICIAN: Robin Acosta MD, [facility]    Chief Complaint:   Chief Complaint   Patient presents with   • Ischemic heart disease       Problem List:  1. Chronic ischemic heart disease:  a. Remote apparent non ST elevation myocardial infarction with abnormal cardiac enzymes and diagnostic coronary arteriography with angioplasty and stent deployment x2 - data deficit, [place], December 2010.  b. Reported acceptable stress test - data deficit, August 2011.   c. Recurrent chest pain syndrome with diagnostic coronary arteriography and angioplasty and stent deployment - data deficit, September 2012.   d. Recurrent chest pain syndrome with acceptable Cardiolite GXT (100% PMHR; 89% of predicted exercise capacity) without ischemic EKG changes, marked ectopy, anginal type chest discomfort without myocardial scar/reversible ischemia with preserved LVEF (0.62) with continued medical therapy felt warranted, October 2014.   e. Residual class I symptoms with acceptable negative quantitative SPECT gated Cardiolite exercise stress test suggestive of low probability of significant residual focal obstructive coronary artery disease with preserved systolic left ventricular function (LVEF 0.68) following exercise to 93% predicted maximum heart rate and only 76% predicted exercise capacity consistent with mild-moderate physical deconditioning, January 2020.  f. Residual class I symptoms, December 2020, September 2021  2. Chronic hypertension - probable essential.   3. Dyslipidemia.   4. Chronic lower tract obstructive syndrome - probable BPH.  5. Intermittent depression.   6. Chronic tobacco use: Discontinued December 2015 and restarted spring 2017, Discontinued  10/27/17.  7. Severe sleep apnea per sleep study (January 2018) with recommendations for CPAP.  8. Prediabetes mellitus, hemoglobin A1c 5.75%, October 2019    Allergies   Allergen Reactions   • Wasp Venom Swelling     Swarmed as a child and told to avoid   • Sulfa Antibiotics Rash       Current Outpatient Medications:   •  amLODIPine (NORVASC) 2.5 MG tablet, Take 1 tablet by mouth Daily., Disp: 90 tablet, Rfl: 3  •  aspirin 81 MG tablet, Take 81 mg by mouth daily., Disp: , Rfl:   •  cholecalciferol (VITAMIN D3) 1000 UNITS tablet, Take 2,000 Units by mouth daily., Disp: , Rfl:   •  clopidogrel (PLAVIX) 75 MG tablet, Take 1 tablet by mouth Daily., Disp: 90 tablet, Rfl: 3  •  ezetimibe (ZETIA) 10 MG tablet, Take 1 tablet by mouth Daily., Disp: 90 tablet, Rfl: 3  •  isosorbide mononitrate (IMDUR) 30 MG 24 hr tablet, Take 1 tablet by mouth Daily., Disp: 90 tablet, Rfl: 3  •  metoprolol tartrate (LOPRESSOR) 25 MG tablet, Take 1 tablet by mouth 2 (Two) Times a Day., Disp: 180 tablet, Rfl: 3  •  nitroglycerin (NITROSTAT) 0.4 MG SL tablet, Place 1 tablet under the tongue Every 5 (Five) Minutes As Needed for Chest Pain. Take no more than 3 doses in 15 minutes., Disp: 25 tablet, Rfl: 2  •  rosuvastatin (CRESTOR) 40 MG tablet, Take 1 tablet by mouth Daily., Disp: 90 tablet, Rfl: 3  •  tadalafil (CIALIS) 5 MG tablet, TAKE ONE TABLET BY MOUTH DAILY, Disp: 90 tablet, Rfl: 10    History of Present Illness: Patient returns for scheduled 9-month follow up. He has been feeling well overall from a cardiovascular standpoint. Patient denies chest pain, shortness of breath, palpitations, edema, dizziness, and syncope. He notes he has not been as active as he would like to be in the past few months. He has had no interim ER visits, hospitalizations, serious illnesses, or surgeries. He continues to travel frequently for work. He has received COVID immunization.      ROS   Obtained and negative except as outlined in problem list and  "HPI.    Procedures       Objective:       Vitals:    09/24/21 1059 09/24/21 1101   BP: 133/85 141/92   BP Location: Left arm Left arm   Patient Position: Sitting Standing   Pulse: 58 60   SpO2: 96%    Weight: 113 kg (248 lb 12.8 oz)    Height: 185.4 cm (73\")      Body mass index is 32.83 kg/m².  Last weight: 240 lb    Vitals reviewed.   Constitutional:       Appearance: Well-developed.   Neck:      Thyroid: No thyromegaly.      Vascular: No carotid bruit or JVD.      Lymphadenopathy: No cervical adenopathy.   Pulmonary:      Effort: Pulmonary effort is normal.      Breath sounds: Normal breath sounds. No wheezing. No rhonchi. No rales.   Cardiovascular:      Regular rhythm.      No gallop. No S3 gallop.   Pulses:     Dorsalis pedis: 2+ bilaterally.     Posterior tibial: 2+ bilaterally.  Edema:     Peripheral edema absent.   Abdominal:      Palpations: Abdomen is soft. There is no abdominal mass.      Tenderness: There is no abdominal tenderness.   Musculoskeletal: Normal range of motion. Skin:     General: Skin is warm and dry.      Findings: No rash.   Neurological:      Mental Status: Alert and oriented to person, place, and time.           Lab Review:     No recent laboratory studies available for review today.        Assessment:       Overall continued acceptable course with no new interim cardiopulmonary complaints with good functional status. We will defer additional diagnostic or therapeutic intervention from a cardiac perspective at this time. Hopefull we will be able to review his upcoming laboratory studies.     Diagnosis Plan   1. Ischemic heart disease  No recurrent angina pectoris or CHF on current activity schedule; continue current treatment   2. Essential hypertension  Well controlled. Continue current treatment.   3. Dyslipidemia  No data to review. Continue rosuvastatin.          Plan:         1. Patient to continue current medications and close follow up with the above providers.  2. Tentative " cardiology follow up in April 2022 or patient may return sooner PRN.    I, Prasad Doran, attest that this documentation has been prepared under the direction and in the presence of Florentino Frank MD 09/24/2021    I, Florentino Frank MD, [facility], personally performed the services described in this documentation as scribed by the above named individual in my presence, and it is both accurate and complete. At 11:38 EDT on 09/24/2021

## 2021-11-08 ENCOUNTER — FLU SHOT (OUTPATIENT)
Dept: FAMILY MEDICINE CLINIC | Facility: CLINIC | Age: 56
End: 2021-11-08

## 2021-11-08 DIAGNOSIS — Z23 NEED FOR INFLUENZA VACCINATION: Primary | ICD-10-CM

## 2021-11-08 PROCEDURE — 90686 IIV4 VACC NO PRSV 0.5 ML IM: CPT | Performed by: FAMILY MEDICINE

## 2021-11-08 PROCEDURE — 90471 IMMUNIZATION ADMIN: CPT | Performed by: FAMILY MEDICINE

## 2021-12-09 ENCOUNTER — OFFICE VISIT (OUTPATIENT)
Dept: FAMILY MEDICINE CLINIC | Facility: CLINIC | Age: 56
End: 2021-12-09

## 2021-12-09 VITALS
DIASTOLIC BLOOD PRESSURE: 80 MMHG | SYSTOLIC BLOOD PRESSURE: 122 MMHG | TEMPERATURE: 98 F | HEIGHT: 73 IN | BODY MASS INDEX: 32.6 KG/M2 | HEART RATE: 56 BPM | WEIGHT: 246 LBS | OXYGEN SATURATION: 98 %

## 2021-12-09 DIAGNOSIS — Z12.5 SCREENING FOR PROSTATE CANCER: ICD-10-CM

## 2021-12-09 DIAGNOSIS — Z00.00 WELL ADULT EXAM: Primary | ICD-10-CM

## 2021-12-09 PROCEDURE — G0103 PSA SCREENING: HCPCS | Performed by: FAMILY MEDICINE

## 2021-12-09 PROCEDURE — 80053 COMPREHEN METABOLIC PANEL: CPT | Performed by: FAMILY MEDICINE

## 2021-12-09 PROCEDURE — 99396 PREV VISIT EST AGE 40-64: CPT | Performed by: FAMILY MEDICINE

## 2021-12-09 PROCEDURE — 83036 HEMOGLOBIN GLYCOSYLATED A1C: CPT | Performed by: FAMILY MEDICINE

## 2021-12-09 PROCEDURE — 80061 LIPID PANEL: CPT | Performed by: FAMILY MEDICINE

## 2021-12-09 PROCEDURE — 85007 BL SMEAR W/DIFF WBC COUNT: CPT | Performed by: FAMILY MEDICINE

## 2021-12-09 PROCEDURE — 85025 COMPLETE CBC W/AUTO DIFF WBC: CPT | Performed by: FAMILY MEDICINE

## 2021-12-09 NOTE — PROGRESS NOTES
Robin Whitlock is a 56 y.o. male who presents today to complete annual exam.    Chief Complaint   Patient presents with   • Annual Exam        Diet: Feels he could eat a more balanced diet. Travels a lot for work and is forced to eat at restaurants often. He does not take a multivitamin.  Exercise: Has been walking more in recent months. He walks most days of the week, and his target is 7 miles every day. He does not do weight/resistance training.    Follows regularly with optometrist, dentist, and a cardiologist, all exams up to date.    Mood: Generally happy, no concerns  Vaccines: Already vaccinated for COVID x3, flu, pneumococcal, zoster, Tdap. None needed today.  Screenings: Yearly bloodwork including lipid panel. Next colonoscopy due 09/22.           Review of Systems   Constitutional: Negative for fever and unexpected weight loss.   HENT: Negative for congestion, ear pain and sore throat.    Eyes: Negative for visual disturbance.   Respiratory: Negative for cough, shortness of breath and wheezing.    Cardiovascular: Negative for chest pain and palpitations.   Gastrointestinal: Negative for abdominal pain, blood in stool, constipation, diarrhea, nausea, vomiting and GERD.   Endocrine: Negative for polydipsia and polyuria.   Genitourinary: Negative for difficulty urinating.   Musculoskeletal: Negative for joint swelling.   Skin: Negative for rash and skin lesions.   Allergic/Immunologic: Negative for environmental allergies.   Neurological: Negative for seizures and syncope.   Hematological: Does not bruise/bleed easily.   Psychiatric/Behavioral: Negative for suicidal ideas.        PHQ-9 Depression Screening  Little interest or pleasure in doing things? 0   Feeling down, depressed, or hopeless? 0   Trouble falling or staying asleep, or sleeping too much?     Feeling tired or having little energy?     Poor appetite or overeating?     Feeling bad about yourself - or that you are a failure or have let  yourself or your family down?     Trouble concentrating on things, such as reading the newspaper or watching television?     Moving or speaking so slowly that other people could have noticed? Or the opposite - being so fidgety or restless that you have been moving around a lot more than usual?     Thoughts that you would be better off dead, or of hurting yourself in some way?     PHQ-9 Total Score 0   If you checked off any problems, how difficult have these problems made it for you to do your work, take care of things at home, or get along with other people?         Past Medical History:   Diagnosis Date   • BPH (benign prostatic hypertrophy)    • CAD (coronary artery disease)    • Cystic acne    • Depression    • ED (erectile dysfunction) of non-organic origin    • History of myocardial infarction    • Hyperlipidemia    • Hypertension 2010   • Myocardial infarction (HCC) 2010   • Obesity 2010   • [place] (obstructive sleep apnea)         Past Surgical History:   Procedure Laterality Date   • CARDIAC CATHETERIZATION     • COLONOSCOPY     • CORONARY STENT PLACEMENT          Family History   Problem Relation Age of Onset   • Heart attack Father    • Multiple sclerosis Father    • Heart failure Father    • Lung cancer Father    • Cancer Father         multple cancers   • Heart disease Father    • Heart failure Mother    • Heart attack Mother    • Lung cancer Mother    • Cancer Mother         lung cancer   • Heart disease Mother    • No Known Problems Sister         Social History     Socioeconomic History   • Marital status:      Spouse name: N/A   • Number of children: 0   • Years of education: College   • Highest education level: Bachelor's degree (e.g., BA, AB, BS)   Tobacco Use   • Smoking status: Former Smoker     Packs/day: 0.50     Years: 27.00     Pack years: 13.50     Types: Cigarettes     Start date: 3/29/2017     Quit date: 10/27/2017     Years since quittin.1   • Smokeless  "tobacco: Never Used   Substance and Sexual Activity   • Alcohol use: Yes     Alcohol/week: 42.0 standard drinks     Types: 42 Cans of beer per week     Comment: often   • Drug use: No   • Sexual activity: Yes     Partners: Female     Birth control/protection: Condom     Comment: wife only        Current Outpatient Medications on File Prior to Visit   Medication Sig Dispense Refill   • amLODIPine (NORVASC) 2.5 MG tablet Take 1 tablet by mouth Daily. 90 tablet 3   • aspirin 81 MG tablet Take 81 mg by mouth daily.     • cholecalciferol (VITAMIN D3) 1000 UNITS tablet Take 2,000 Units by mouth daily.     • clopidogrel (PLAVIX) 75 MG tablet Take 1 tablet by mouth Daily. 90 tablet 3   • ezetimibe (ZETIA) 10 MG tablet Take 1 tablet by mouth Daily. 90 tablet 3   • isosorbide mononitrate (IMDUR) 30 MG 24 hr tablet Take 1 tablet by mouth Daily. 90 tablet 3   • metoprolol tartrate (LOPRESSOR) 25 MG tablet Take 1 tablet by mouth 2 (Two) Times a Day. 180 tablet 3   • nitroglycerin (NITROSTAT) 0.4 MG SL tablet Place 1 tablet under the tongue Every 5 (Five) Minutes As Needed for Chest Pain. Take no more than 3 doses in 15 minutes. 25 tablet 2   • rosuvastatin (CRESTOR) 40 MG tablet Take 1 tablet by mouth Daily. 90 tablet 3   • tadalafil (CIALIS) 5 MG tablet TAKE ONE TABLET BY MOUTH DAILY 90 tablet 10     No current facility-administered medications on file prior to visit.       Allergies   Allergen Reactions   • Wasp Venom Swelling     Swarmed as a child and told to avoid   • Sulfa Antibiotics Rash        Visit Vitals  /80   Pulse 56   Temp 98 °F (36.7 °C)   Ht 185.4 cm (73\")   Wt 112 kg (246 lb)   SpO2 98%   BMI 32.46 kg/m²      Body mass index is 32.46 kg/m².    Physical Exam  Vitals and nursing note reviewed.   Constitutional:       General: He is not in acute distress.     Appearance: He is well-developed. He is not diaphoretic.   HENT:      Head: Atraumatic.   Cardiovascular:      Rate and Rhythm: Normal rate and regular " rhythm.      Heart sounds: Normal heart sounds. No murmur heard.  No friction rub. No gallop.    Pulmonary:      Effort: Pulmonary effort is normal. No respiratory distress.      Breath sounds: Normal breath sounds. No stridor. No wheezing, rhonchi or rales.   Abdominal:      General: Bowel sounds are normal. There is no distension.      Palpations: Abdomen is soft. There is no mass.      Tenderness: There is no abdominal tenderness. There is no guarding or rebound.      Hernia: No hernia is present.   Musculoskeletal:      Cervical back: Normal range of motion and neck supple.   Skin:     General: Skin is warm and dry.   Neurological:      Mental Status: He is alert and oriented to person, place, and time.   Psychiatric:         Behavior: Behavior normal.               Problems Addressed this Visit        Genitourinary and Reproductive     Screening for prostate cancer    Relevant Orders    PSA Screen       Health Encounters    Well adult exam - Primary     The patient is here for health maintenance visit.  Currently, the patient consumes a healthy diet and has an adequate exercise regimen.  Screening lab work is ordered.  Immunizations were reviewed today.  Advice and education was given regarding nutrition, aerobic exercise, routine dental evaluations, routine eye exams, reproductive health, cardiovascular risk reduction, sunscreen use, self skin examination (annual dermatology evaluations) and seatbelt use (general overall safety).  Further recommendations will be given if needed after lab evaluation.  Annual wellness evaluation is recommended.           Relevant Orders    CBC & Differential    Comprehensive Metabolic Panel    Hemoglobin A1c    PSA Screen    Lipid Panel      Diagnoses       Codes Comments    Well adult exam    -  Primary ICD-10-CM: Z00.00  ICD-9-CM: V70.0     Screening for prostate cancer     ICD-10-CM: Z12.5  ICD-9-CM: V76.44           Return in about 1 year (around 12/9/2022) for Annual.    I  attest that I have reviewed the student note and that the components of the history of the present illness, the physical exam, and the assessment and plan documented were performed by me or were performed in my presence by the student and verified by me.    Be Calderon MD  12/9/2021

## 2021-12-10 LAB
ALBUMIN SERPL-MCNC: 4.6 G/DL (ref 3.5–5.2)
ALBUMIN/GLOB SERPL: 1.6 G/DL
ALP SERPL-CCNC: 65 U/L (ref 39–117)
ALT SERPL W P-5'-P-CCNC: 41 U/L (ref 1–41)
ANION GAP SERPL CALCULATED.3IONS-SCNC: 9.8 MMOL/L (ref 5–15)
AST SERPL-CCNC: 36 U/L (ref 1–40)
BASOPHILS # BLD AUTO: 0.08 10*3/MM3 (ref 0–0.2)
BASOPHILS NFR BLD AUTO: 1.2 % (ref 0–1.5)
BILIRUB SERPL-MCNC: 0.6 MG/DL (ref 0–1.2)
BUN SERPL-MCNC: 13 MG/DL (ref 6–20)
BUN/CREAT SERPL: 13.4 (ref 7–25)
CALCIUM SPEC-SCNC: 10.1 MG/DL (ref 8.6–10.5)
CHLORIDE SERPL-SCNC: 102 MMOL/L (ref 98–107)
CHOLEST SERPL-MCNC: 124 MG/DL (ref 0–200)
CO2 SERPL-SCNC: 27.2 MMOL/L (ref 22–29)
CREAT SERPL-MCNC: 0.97 MG/DL (ref 0.76–1.27)
DEPRECATED RDW RBC AUTO: 45 FL (ref 37–54)
EOSINOPHIL # BLD AUTO: 0.1 10*3/MM3 (ref 0–0.4)
EOSINOPHIL NFR BLD AUTO: 1.5 % (ref 0.3–6.2)
ERYTHROCYTE [DISTWIDTH] IN BLOOD BY AUTOMATED COUNT: 12.5 % (ref 12.3–15.4)
GFR SERPL CREATININE-BSD FRML MDRD: 81 ML/MIN/1.73
GLOBULIN UR ELPH-MCNC: 2.9 GM/DL
GLUCOSE SERPL-MCNC: 105 MG/DL (ref 65–99)
HBA1C MFR BLD: 5.77 % (ref 4.8–5.6)
HCT VFR BLD AUTO: 46.6 % (ref 37.5–51)
HDLC SERPL-MCNC: 46 MG/DL (ref 40–60)
HGB BLD-MCNC: 15.5 G/DL (ref 13–17.7)
IMM GRANULOCYTES # BLD AUTO: 0.02 10*3/MM3 (ref 0–0.05)
IMM GRANULOCYTES NFR BLD AUTO: 0.3 % (ref 0–0.5)
LDLC SERPL CALC-MCNC: 55 MG/DL (ref 0–100)
LDLC/HDLC SERPL: 1.12 {RATIO}
LYMPHOCYTES # BLD AUTO: 1.19 10*3/MM3 (ref 0.7–3.1)
LYMPHOCYTES NFR BLD AUTO: 18.2 % (ref 19.6–45.3)
MCH RBC QN AUTO: 32.3 PG (ref 26.6–33)
MCHC RBC AUTO-ENTMCNC: 33.3 G/DL (ref 31.5–35.7)
MCV RBC AUTO: 97.1 FL (ref 79–97)
MONOCYTES # BLD AUTO: 0.77 10*3/MM3 (ref 0.1–0.9)
MONOCYTES NFR BLD AUTO: 11.8 % (ref 5–12)
NEUTROPHILS NFR BLD AUTO: 4.39 10*3/MM3 (ref 1.7–7)
NEUTROPHILS NFR BLD AUTO: 67 % (ref 42.7–76)
NRBC BLD AUTO-RTO: 0 /100 WBC (ref 0–0.2)
PLAT MORPH BLD: NORMAL
PLATELET # BLD AUTO: 175 10*3/MM3 (ref 140–450)
PMV BLD AUTO: 11.7 FL (ref 6–12)
POTASSIUM SERPL-SCNC: 4.5 MMOL/L (ref 3.5–5.2)
PROT SERPL-MCNC: 7.5 G/DL (ref 6–8.5)
PSA SERPL-MCNC: 0.52 NG/ML (ref 0–4)
RBC # BLD AUTO: 4.8 10*6/MM3 (ref 4.14–5.8)
RBC MORPH BLD: NORMAL
SODIUM SERPL-SCNC: 139 MMOL/L (ref 136–145)
TRIGL SERPL-MCNC: 133 MG/DL (ref 0–150)
VLDLC SERPL-MCNC: 23 MG/DL (ref 5–40)
WBC MORPH BLD: NORMAL
WBC NRBC COR # BLD: 6.55 10*3/MM3 (ref 3.4–10.8)

## 2022-04-16 DIAGNOSIS — N40.1 BENIGN NON-NODULAR PROSTATIC HYPERPLASIA WITH LOWER URINARY TRACT SYMPTOMS: ICD-10-CM

## 2022-04-19 RX ORDER — TADALAFIL 5 MG/1
5 TABLET ORAL DAILY
Qty: 90 TABLET | Refills: 10 | Status: SHIPPED | OUTPATIENT
Start: 2022-04-19

## 2022-07-15 NOTE — PROGRESS NOTES
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     REFERRING PHYSICIAN: Ward Arana MD  CURRENT PHYSICIAN:  Be Calderon MD  SLEEP PHYSICIAN: Robin Acosta MD, [facility]    Chief Complaint:   Chief Complaint   Patient presents with   • Ischemic heart disease       Problem List:  1. Chronic ischemic heart disease:  a. Remote apparent non ST elevation myocardial infarction with abnormal cardiac enzymes and diagnostic coronary arteriography with angioplasty and stent deployment x2 - data deficit, [place], December 2010.  b. Reported acceptable stress test - data deficit, August 2011.   c. Recurrent chest pain syndrome with diagnostic coronary arteriography and angioplasty and stent deployment - data deficit, September 2012.   d. Recurrent chest pain syndrome with acceptable Cardiolite GXT (100% PMHR; 89% of predicted exercise capacity) without ischemic EKG changes, marked ectopy, anginal type chest discomfort without myocardial scar/reversible ischemia with preserved LVEF (0.62) with continued medical therapy felt warranted, October 2014.   e. Residual class I symptoms with acceptable negative quantitative SPECT gated Cardiolite exercise stress test suggestive of low probability of significant residual focal obstructive coronary artery disease with preserved systolic left ventricular function (LVEF 0.68) following exercise to 93% predicted maximum heart rate and only 76% predicted exercise capacity consistent with mild-moderate physical deconditioning, January 2020.  f. Residual class I symptoms, December 2020, September 2021, July 2022  2. Chronic hypertension - probable essential.   3. Dyslipidemia.   4. Chronic lower tract obstructive syndrome - probable BPH.  5. Intermittent depression.   6. Chronic tobacco use: Discontinued December 2015 and restarted spring 2017,  Discontinued 10/27/17.  7. Severe sleep apnea per sleep study (January 2018) with recommendations for CPAP.  8. Prediabetes mellitus, hemoglobin A1c 5.75%, October 2019, 5.77% December 2021    Allergies   Allergen Reactions   • Wasp Venom Swelling     Swarmed as a child and told to avoid   • Sulfa Antibiotics Rash       Current Outpatient Medications:   •  amLODIPine (NORVASC) 2.5 MG tablet, Take 1 tablet by mouth Daily., Disp: 90 tablet, Rfl: 3  •  aspirin 81 MG tablet, Take 81 mg by mouth daily., Disp: , Rfl:   •  cholecalciferol (VITAMIN D3) 1000 UNITS tablet, Take 2,000 Units by mouth daily., Disp: , Rfl:   •  clopidogrel (PLAVIX) 75 MG tablet, Take 1 tablet by mouth Daily., Disp: 90 tablet, Rfl: 3  •  ezetimibe (ZETIA) 10 MG tablet, Take 1 tablet by mouth Daily., Disp: 90 tablet, Rfl: 3  •  isosorbide mononitrate (IMDUR) 30 MG 24 hr tablet, Take 1 tablet by mouth Daily., Disp: 90 tablet, Rfl: 3  •  metoprolol tartrate (LOPRESSOR) 25 MG tablet, Take 1 tablet by mouth 2 (Two) Times a Day., Disp: 180 tablet, Rfl: 3  •  nitroglycerin (NITROSTAT) 0.4 MG SL tablet, Place 1 tablet under the tongue Every 5 (Five) Minutes As Needed for Chest Pain. Take no more than 3 doses in 15 minutes., Disp: 25 tablet, Rfl: 2  •  rosuvastatin (CRESTOR) 40 MG tablet, Take 1 tablet by mouth Daily., Disp: 90 tablet, Rfl: 3  •  tadalafil (CIALIS) 5 MG tablet, Take 1 tablet by mouth Daily., Disp: 90 tablet, Rfl: 10    History of Present Illness: Patient returns after 10-month hiatus for follow up. Patient reports that he recently report that he recently got home from a work trip in [place], [place]. He was home the first two weeks of July, and will be travelling the [place] and [place], including [facility], for five weeks for another work trip. He is able to do his day to day activities without cardiopulmonary complaints. He reports that he had been experiencing some work-related stress, due to multiple people in his work group not  "working well together. Patient denies chest pain, shortness of breath, orthopnea, palpitations, edema, dizziness, and syncope.  He has had no interim ER visits, hospitalizations, serious illnesses, or surgeries.        ROS   Obtained and negative except as outlined in problem list and HPI.    Procedures       Objective:       Vitals:    07/20/22 1505 07/20/22 1506   BP: 126/84 127/90   BP Location: Right arm Right arm   Patient Position: Sitting Standing   Pulse: 70 74   SpO2: 96% 96%   Weight: 112 kg (246 lb)    Height: 185.4 cm (73\")      Body mass index is 32.46 kg/m².  Last weight: 248 lbs    Vitals reviewed.   Constitutional:       Appearance: Well-developed.   Neck:      Thyroid: No thyromegaly.      Vascular: No carotid bruit or JVD.      Lymphadenopathy: No cervical adenopathy.   Pulmonary:      Effort: Pulmonary effort is normal.      Breath sounds: Normal breath sounds. No wheezing. No rhonchi. No rales.   Cardiovascular:      Regular rhythm.      Murmurs: There is a grade 1/6 mid frequency early systolic murmur at the LLSB.      No gallop. No S3 gallop.   Pulses:     Dorsalis pedis: 2+ bilaterally.     Posterior tibial: 2+ bilaterally.  Abdominal:      Palpations: Abdomen is soft. There is no abdominal mass.      Tenderness: There is no abdominal tenderness.   Musculoskeletal: Normal range of motion. Skin:     General: Skin is warm and dry.      Findings: No rash.   Neurological:      Mental Status: Alert and oriented to person, place, and time.       Lab Review:     No recent laboratory studies available for review today.    Lab Results   Component Value Date    GLUCOSE 105 (H) 12/09/2021    BUN 13 12/09/2021    CREATININE 0.97 12/09/2021    EGFRIFNONA 81 12/09/2021    BCR 13.4 12/09/2021     12/09/2021    K 4.5 12/09/2021     12/09/2021    CO2 27.2 12/09/2021    CALCIUM 10.1 12/09/2021    ALBUMIN 4.60 12/09/2021    AST 36 12/09/2021    ALT 41 12/09/2021       Lab Results   Component Value " Date    WBC 6.55 12/09/2021    HGB 15.5 12/09/2021    HCT 46.6 12/09/2021    MCV 97.1 (H) 12/09/2021     12/09/2021       Lab Results   Component Value Date    HGBA1C 5.77 (H) 12/09/2021       Lab Results   Component Value Date    CHOL 124 12/09/2021    CHOL 155 10/20/2020    CHOL 152 10/15/2019     Lab Results   Component Value Date    TRIG 133 12/09/2021    TRIG 119 10/20/2020    TRIG 119 10/15/2019     Lab Results   Component Value Date    HDL 46 12/09/2021    HDL 54 10/20/2020    HDL 52 10/15/2019     Lab Results   Component Value Date    LDL 55 12/09/2021    LDL 80 10/20/2020    LDL 76 10/15/2019         Assessment:       Overall continued acceptable course with no new interim cardiopulmonary complaints with good functional status. We will defer additional diagnostic or therapeutic intervention from a cardiac perspective at this time. Hopefully any upcoming laboratory study results can be shared with us for review. Would consider trial of Jardiance 10 mg daily if Dr. Calderon concurs.     Diagnosis Plan   1. Ischemic heart disease  No recurrent angina pectoris or CHF on current activity schedule; continue current treatment   2. Essential hypertension  Excellent control. Continue current treatment, including heart healthy diet, exercise, and cardiac medications.   3. Dyslipidemia  Excellent control. Continue current treatment, including heart healthy diet, exercise, and cardiac medications.          Plan:         1. Patient to continue current medications and close follow up with the above providers.  2. Patient is encouraged to implement a regular aerobic exercise routine, at least 30 minutes daily, 4-5 days per week.  3. Tentative cardiology follow up in March 2023 or patient may return sooner PRN.    Scribed for Florentino Frank MD by Jami Carrera. 7/20/2022  15:18 EDT    I, Florentino Frank MD, [facility], personally performed the services described in this documentation as scribed by the above named  individual in my presence, and it is both accurate and complete. At 15:25 EDT on 07/20/2022

## 2022-07-20 ENCOUNTER — OFFICE VISIT (OUTPATIENT)
Dept: CARDIOLOGY | Facility: CLINIC | Age: 57
End: 2022-07-20

## 2022-07-20 VITALS
BODY MASS INDEX: 32.6 KG/M2 | DIASTOLIC BLOOD PRESSURE: 90 MMHG | OXYGEN SATURATION: 96 % | WEIGHT: 246 LBS | HEART RATE: 74 BPM | HEIGHT: 73 IN | SYSTOLIC BLOOD PRESSURE: 127 MMHG

## 2022-07-20 DIAGNOSIS — I25.9 ISCHEMIC HEART DISEASE: Primary | ICD-10-CM

## 2022-07-20 DIAGNOSIS — I10 ESSENTIAL HYPERTENSION: ICD-10-CM

## 2022-07-20 DIAGNOSIS — E78.5 DYSLIPIDEMIA: ICD-10-CM

## 2022-07-20 PROCEDURE — 99214 OFFICE O/P EST MOD 30 MIN: CPT | Performed by: INTERNAL MEDICINE

## 2022-07-21 DIAGNOSIS — I25.10 CORONARY ARTERY DISEASE DUE TO LIPID RICH PLAQUE: ICD-10-CM

## 2022-07-21 DIAGNOSIS — I25.83 CORONARY ARTERY DISEASE DUE TO LIPID RICH PLAQUE: ICD-10-CM

## 2022-07-22 RX ORDER — EZETIMIBE 10 MG/1
TABLET ORAL
Qty: 90 TABLET | Refills: 3 | Status: SHIPPED | OUTPATIENT
Start: 2022-07-22

## 2022-07-22 RX ORDER — ISOSORBIDE MONONITRATE 30 MG/1
TABLET, EXTENDED RELEASE ORAL
Qty: 90 TABLET | Refills: 3 | Status: SHIPPED | OUTPATIENT
Start: 2022-07-22

## 2022-07-22 RX ORDER — AMLODIPINE BESYLATE 2.5 MG/1
TABLET ORAL
Qty: 90 TABLET | Refills: 3 | Status: SHIPPED | OUTPATIENT
Start: 2022-07-22

## 2022-07-22 RX ORDER — ROSUVASTATIN CALCIUM 40 MG/1
TABLET, COATED ORAL
Qty: 90 TABLET | Refills: 3 | Status: SHIPPED | OUTPATIENT
Start: 2022-07-22

## 2022-07-22 RX ORDER — CLOPIDOGREL BISULFATE 75 MG/1
TABLET ORAL
Qty: 90 TABLET | Refills: 3 | Status: SHIPPED | OUTPATIENT
Start: 2022-07-22

## 2022-07-22 NOTE — TELEPHONE ENCOUNTER
Rx Refill Note  Requested Prescriptions     Pending Prescriptions Disp Refills   • amLODIPine (NORVASC) 2.5 MG tablet [Pharmacy Med Name: AMLODIPINE BESYLATE TABS 2.5MG] 90 tablet 3     Sig: TAKE 1 TABLET DAILY   • clopidogrel (PLAVIX) 75 MG tablet [Pharmacy Med Name: CLOPIDOGREL BISULFATE TABS 75MG] 90 tablet 3     Sig: TAKE 1 TABLET DAILY   • ezetimibe (ZETIA) 10 MG tablet [Pharmacy Med Name: EZETIMIBE TABS 10MG] 90 tablet 3     Sig: TAKE 1 TABLET DAILY   • isosorbide mononitrate (IMDUR) 30 MG 24 hr tablet [Pharmacy Med Name: ISOSORBIDE MONONITRATE ER TABS 30MG] 90 tablet 3     Sig: TAKE 1 TABLET DAILY   • metoprolol tartrate (LOPRESSOR) 25 MG tablet [Pharmacy Med Name: METOPROLOL TARTRATE TABS 25MG] 180 tablet 3     Sig: TAKE 1 TABLET TWICE A DAY   • rosuvastatin (CRESTOR) 40 MG tablet [Pharmacy Med Name: ROSUVASTATIN TABS 40MG] 90 tablet 3     Sig: TAKE 1 TABLET DAILY      Last office visit with prescribing clinician: 12/9/2021      Next office visit with prescribing clinician: 12/15/2022            Daniel Elder MA  07/22/22, 07:29 EDT

## 2022-12-15 ENCOUNTER — OFFICE VISIT (OUTPATIENT)
Dept: FAMILY MEDICINE CLINIC | Facility: CLINIC | Age: 57
End: 2022-12-15

## 2022-12-15 ENCOUNTER — LAB (OUTPATIENT)
Dept: LAB | Facility: HOSPITAL | Age: 57
End: 2022-12-15

## 2022-12-15 VITALS
BODY MASS INDEX: 33.19 KG/M2 | OXYGEN SATURATION: 97 % | TEMPERATURE: 97 F | DIASTOLIC BLOOD PRESSURE: 80 MMHG | SYSTOLIC BLOOD PRESSURE: 120 MMHG | HEART RATE: 64 BPM | HEIGHT: 73 IN | WEIGHT: 250.4 LBS

## 2022-12-15 DIAGNOSIS — I10 ESSENTIAL HYPERTENSION: ICD-10-CM

## 2022-12-15 DIAGNOSIS — E78.2 MIXED HYPERLIPIDEMIA: ICD-10-CM

## 2022-12-15 DIAGNOSIS — Z12.5 SCREENING FOR PROSTATE CANCER: ICD-10-CM

## 2022-12-15 DIAGNOSIS — Z12.11 COLON CANCER SCREENING: ICD-10-CM

## 2022-12-15 DIAGNOSIS — Z00.00 WELL ADULT EXAM: Primary | ICD-10-CM

## 2022-12-15 DIAGNOSIS — Z00.00 WELL ADULT EXAM: ICD-10-CM

## 2022-12-15 LAB
ALBUMIN SERPL-MCNC: 4.6 G/DL (ref 3.5–5.2)
ALBUMIN/GLOB SERPL: 1.5 G/DL
ALP SERPL-CCNC: 77 U/L (ref 39–117)
ALT SERPL W P-5'-P-CCNC: 39 U/L (ref 1–41)
ANION GAP SERPL CALCULATED.3IONS-SCNC: 10.5 MMOL/L (ref 5–15)
AST SERPL-CCNC: 39 U/L (ref 1–40)
BASOPHILS # BLD AUTO: 0.08 10*3/MM3 (ref 0–0.2)
BASOPHILS NFR BLD AUTO: 1.3 % (ref 0–1.5)
BILIRUB SERPL-MCNC: 0.5 MG/DL (ref 0–1.2)
BUN SERPL-MCNC: 11 MG/DL (ref 6–20)
BUN/CREAT SERPL: 10.1 (ref 7–25)
CALCIUM SPEC-SCNC: 9.9 MG/DL (ref 8.6–10.5)
CHLORIDE SERPL-SCNC: 102 MMOL/L (ref 98–107)
CHOLEST SERPL-MCNC: 143 MG/DL (ref 0–200)
CO2 SERPL-SCNC: 26.5 MMOL/L (ref 22–29)
CREAT SERPL-MCNC: 1.09 MG/DL (ref 0.76–1.27)
DEPRECATED RDW RBC AUTO: 41.8 FL (ref 37–54)
EGFRCR SERPLBLD CKD-EPI 2021: 80.2 ML/MIN/1.73
EOSINOPHIL # BLD AUTO: 0.19 10*3/MM3 (ref 0–0.4)
EOSINOPHIL NFR BLD AUTO: 3 % (ref 0.3–6.2)
ERYTHROCYTE [DISTWIDTH] IN BLOOD BY AUTOMATED COUNT: 12.3 % (ref 12.3–15.4)
GLOBULIN UR ELPH-MCNC: 3 GM/DL
GLUCOSE SERPL-MCNC: 100 MG/DL (ref 65–99)
HBA1C MFR BLD: 5.7 % (ref 4.8–5.6)
HCT VFR BLD AUTO: 42.1 % (ref 37.5–51)
HDLC SERPL-MCNC: 48 MG/DL (ref 40–60)
HGB BLD-MCNC: 14.8 G/DL (ref 13–17.7)
IMM GRANULOCYTES # BLD AUTO: 0.01 10*3/MM3 (ref 0–0.05)
IMM GRANULOCYTES NFR BLD AUTO: 0.2 % (ref 0–0.5)
LDLC SERPL CALC-MCNC: 74 MG/DL (ref 0–100)
LDLC/HDLC SERPL: 1.48 {RATIO}
LYMPHOCYTES # BLD AUTO: 1.32 10*3/MM3 (ref 0.7–3.1)
LYMPHOCYTES NFR BLD AUTO: 20.9 % (ref 19.6–45.3)
MCH RBC QN AUTO: 32.5 PG (ref 26.6–33)
MCHC RBC AUTO-ENTMCNC: 35.2 G/DL (ref 31.5–35.7)
MCV RBC AUTO: 92.5 FL (ref 79–97)
MONOCYTES # BLD AUTO: 0.76 10*3/MM3 (ref 0.1–0.9)
MONOCYTES NFR BLD AUTO: 12 % (ref 5–12)
NEUTROPHILS NFR BLD AUTO: 3.96 10*3/MM3 (ref 1.7–7)
NEUTROPHILS NFR BLD AUTO: 62.6 % (ref 42.7–76)
NRBC BLD AUTO-RTO: 0 /100 WBC (ref 0–0.2)
PLATELET # BLD AUTO: 162 10*3/MM3 (ref 140–450)
PMV BLD AUTO: 11.7 FL (ref 6–12)
POTASSIUM SERPL-SCNC: 4.7 MMOL/L (ref 3.5–5.2)
PROT SERPL-MCNC: 7.6 G/DL (ref 6–8.5)
PSA SERPL-MCNC: 0.54 NG/ML (ref 0–4)
RBC # BLD AUTO: 4.55 10*6/MM3 (ref 4.14–5.8)
SODIUM SERPL-SCNC: 139 MMOL/L (ref 136–145)
TRIGL SERPL-MCNC: 119 MG/DL (ref 0–150)
VLDLC SERPL-MCNC: 21 MG/DL (ref 5–40)
WBC NRBC COR # BLD: 6.32 10*3/MM3 (ref 3.4–10.8)

## 2022-12-15 PROCEDURE — G0103 PSA SCREENING: HCPCS

## 2022-12-15 PROCEDURE — 80061 LIPID PANEL: CPT

## 2022-12-15 PROCEDURE — 80053 COMPREHEN METABOLIC PANEL: CPT

## 2022-12-15 PROCEDURE — 83036 HEMOGLOBIN GLYCOSYLATED A1C: CPT

## 2022-12-15 PROCEDURE — 99396 PREV VISIT EST AGE 40-64: CPT | Performed by: FAMILY MEDICINE

## 2022-12-15 PROCEDURE — 85025 COMPLETE CBC W/AUTO DIFF WBC: CPT

## 2022-12-15 NOTE — PROGRESS NOTES
Robin Whitlock is a 57 y.o. male who presents today to complete annual exam.    Chief Complaint   Patient presents with   • Annual Exam        Patient is here for annual. He walks for exercise nearly every day around 7 miles a day. He eats a varied and healthy diet. He sees the dentist and optometrist, and sees dermatologist on occasion. He has no acute complaints today.        Review of Systems   Constitutional: Negative for fever and unexpected weight loss.   HENT: Negative for congestion, ear pain and sore throat.    Eyes: Negative for visual disturbance.   Respiratory: Negative for cough, shortness of breath and wheezing.    Cardiovascular: Negative for chest pain and palpitations.   Gastrointestinal: Negative for abdominal pain, blood in stool, constipation, diarrhea, nausea, vomiting and GERD.   Endocrine: Negative for polydipsia and polyuria.   Genitourinary: Negative for difficulty urinating.   Musculoskeletal: Negative for joint swelling.   Skin: Negative for rash and skin lesions.   Allergic/Immunologic: Negative for environmental allergies.   Neurological: Negative for seizures and syncope.   Hematological: Does not bruise/bleed easily.   Psychiatric/Behavioral: Negative for suicidal ideas.        PHQ-9 Depression Screening  Little interest or pleasure in doing things? 0-->not at all   Feeling down, depressed, or hopeless? 0-->not at all   Trouble falling or staying asleep, or sleeping too much?     Feeling tired or having little energy?     Poor appetite or overeating?     Feeling bad about yourself - or that you are a failure or have let yourself or your family down?     Trouble concentrating on things, such as reading the newspaper or watching television?     Moving or speaking so slowly that other people could have noticed? Or the opposite - being so fidgety or restless that you have been moving around a lot more than usual?     Thoughts that you would be better off dead, or of hurting  yourself in some way?     PHQ-9 Total Score 0   If you checked off any problems, how difficult have these problems made it for you to do your work, take care of things at home, or get along with other people?         Past Medical History:   Diagnosis Date   • BPH (benign prostatic hypertrophy)    • CAD (coronary artery disease)    • Cystic acne    • Depression    • ED (erectile dysfunction) of non-organic origin    • History of myocardial infarction    • Hyperlipidemia    • Hypertension 2010   • Myocardial infarction (HCC) 2010   • Obesity 2010   • [place] (obstructive sleep apnea)         Past Surgical History:   Procedure Laterality Date   • CARDIAC CATHETERIZATION     • COLONOSCOPY     • CORONARY STENT PLACEMENT          Family History   Problem Relation Age of Onset   • Heart attack Father    • Multiple sclerosis Father    • Heart failure Father         mutiple heart attacks   • Lung cancer Father    • Cancer Father         multple cancers   • Heart disease Father    • Heart failure Mother         mutiple heart attacks   • Heart attack Mother    • Lung cancer Mother    • Cancer Mother         lung cancer   • Heart disease Mother    • No Known Problems Sister         Social History     Socioeconomic History   • Marital status:      Spouse name: N/A   • Number of children: 0   • Years of education: College   • Highest education level: Bachelor's degree (e.g., BA, AB, BS)   Tobacco Use   • Smoking status: Former     Packs/day: 0.50     Years: 27.00     Pack years: 13.50     Types: Cigarettes     Start date: 3/29/2017     Quit date: 10/27/2017     Years since quittin.1   • Smokeless tobacco: Never   Vaping Use   • Vaping Use: Never used   Substance and Sexual Activity   • Alcohol use: Yes     Alcohol/week: 46.0 standard drinks     Types: 6 Cans of beer, 40 Shots of liquor per week     Comment: often   • Drug use: No   • Sexual activity: Yes     Partners: Female     Birth  "control/protection: Pill     Comment: wife only        Current Outpatient Medications on File Prior to Visit   Medication Sig Dispense Refill   • amLODIPine (NORVASC) 2.5 MG tablet TAKE 1 TABLET DAILY 90 tablet 3   • aspirin 81 MG tablet Take 81 mg by mouth daily.     • cholecalciferol (VITAMIN D3) 1000 UNITS tablet Take 2,000 Units by mouth daily.     • clopidogrel (PLAVIX) 75 MG tablet TAKE 1 TABLET DAILY 90 tablet 3   • ezetimibe (ZETIA) 10 MG tablet TAKE 1 TABLET DAILY 90 tablet 3   • isosorbide mononitrate (IMDUR) 30 MG 24 hr tablet TAKE 1 TABLET DAILY 90 tablet 3   • metoprolol tartrate (LOPRESSOR) 25 MG tablet TAKE 1 TABLET TWICE A  tablet 3   • nitroglycerin (NITROSTAT) 0.4 MG SL tablet Place 1 tablet under the tongue Every 5 (Five) Minutes As Needed for Chest Pain. Take no more than 3 doses in 15 minutes. 25 tablet 2   • rosuvastatin (CRESTOR) 40 MG tablet TAKE 1 TABLET DAILY 90 tablet 3   • tadalafil (CIALIS) 5 MG tablet Take 1 tablet by mouth Daily. 90 tablet 10     No current facility-administered medications on file prior to visit.       Allergies   Allergen Reactions   • Wasp Venom Swelling     Swarmed as a child and told to avoid   • Sulfa Antibiotics Rash        Visit Vitals  /80   Pulse 64   Temp 97 °F (36.1 °C)   Ht 185.4 cm (73\")   Wt 114 kg (250 lb 6.4 oz)   SpO2 97%   BMI 33.04 kg/m²      Body mass index is 33.04 kg/m².    Physical Exam  Constitutional:       General: He is not in acute distress.     Appearance: He is well-developed. He is not diaphoretic.   HENT:      Head: Atraumatic.   Cardiovascular:      Rate and Rhythm: Normal rate and regular rhythm.      Heart sounds: Normal heart sounds. No murmur heard.    No friction rub. No gallop.   Pulmonary:      Effort: Pulmonary effort is normal. No respiratory distress.      Breath sounds: Normal breath sounds. No stridor. No wheezing, rhonchi or rales.   Abdominal:      General: Bowel sounds are normal. There is no distension.     "  Palpations: Abdomen is soft. There is no mass.      Tenderness: There is no abdominal tenderness. There is no guarding or rebound.      Hernia: No hernia is present.   Musculoskeletal:      Cervical back: Normal range of motion and neck supple.   Skin:     General: Skin is warm and dry.   Neurological:      Mental Status: He is alert and oriented to person, place, and time.   Psychiatric:         Behavior: Behavior normal.          Results for orders placed or performed in visit on 12/09/21   Comprehensive Metabolic Panel    Specimen: Arm, Left; Blood   Result Value Ref Range    Glucose 105 (H) 65 - 99 mg/dL    BUN 13 6 - 20 mg/dL    Creatinine 0.97 0.76 - 1.27 mg/dL    Sodium 139 136 - 145 mmol/L    Potassium 4.5 3.5 - 5.2 mmol/L    Chloride 102 98 - 107 mmol/L    CO2 27.2 22.0 - 29.0 mmol/L    Calcium 10.1 8.6 - 10.5 mg/dL    Total Protein 7.5 6.0 - 8.5 g/dL    Albumin 4.60 3.50 - 5.20 g/dL    ALT (SGPT) 41 1 - 41 U/L    AST (SGOT) 36 1 - 40 U/L    Alkaline Phosphatase 65 39 - 117 U/L    Total Bilirubin 0.6 0.0 - 1.2 mg/dL    eGFR Non African Amer 81 >60 mL/min/1.73    Globulin 2.9 gm/dL    A/G Ratio 1.6 g/dL    BUN/Creatinine Ratio 13.4 7.0 - 25.0    Anion Gap 9.8 5.0 - 15.0 mmol/L   Hemoglobin A1c    Specimen: Arm, Left; Blood   Result Value Ref Range    Hemoglobin A1C 5.77 (H) 4.80 - 5.60 %   PSA Screen    Specimen: Arm, Left; Blood   Result Value Ref Range    PSA 0.516 0.000 - 4.000 ng/mL   Lipid Panel    Specimen: Arm, Left; Blood   Result Value Ref Range    Total Cholesterol 124 0 - 200 mg/dL    Triglycerides 133 0 - 150 mg/dL    HDL Cholesterol 46 40 - 60 mg/dL    LDL Cholesterol  55 0 - 100 mg/dL    VLDL Cholesterol 23 5 - 40 mg/dL    LDL/HDL Ratio 1.12    CBC Auto Differential    Specimen: Arm, Left; Blood   Result Value Ref Range    WBC 6.55 3.40 - 10.80 10*3/mm3    RBC 4.80 4.14 - 5.80 10*6/mm3    Hemoglobin 15.5 13.0 - 17.7 g/dL    Hematocrit 46.6 37.5 - 51.0 %    MCV 97.1 (H) 79.0 - 97.0 fL    MCH  32.3 26.6 - 33.0 pg    MCHC 33.3 31.5 - 35.7 g/dL    RDW 12.5 12.3 - 15.4 %    RDW-SD 45.0 37.0 - 54.0 fl    MPV 11.7 6.0 - 12.0 fL    Platelets 175 140 - 450 10*3/mm3    Neutrophil % 67.0 42.7 - 76.0 %    Lymphocyte % 18.2 (L) 19.6 - 45.3 %    Monocyte % 11.8 5.0 - 12.0 %    Eosinophil % 1.5 0.3 - 6.2 %    Basophil % 1.2 0.0 - 1.5 %    Immature Grans % 0.3 0.0 - 0.5 %    Neutrophils, Absolute 4.39 1.70 - 7.00 10*3/mm3    Lymphocytes, Absolute 1.19 0.70 - 3.10 10*3/mm3    Monocytes, Absolute 0.77 0.10 - 0.90 10*3/mm3    Eosinophils, Absolute 0.10 0.00 - 0.40 10*3/mm3    Basophils, Absolute 0.08 0.00 - 0.20 10*3/mm3    Immature Grans, Absolute 0.02 0.00 - 0.05 10*3/mm3    nRBC 0.0 0.0 - 0.2 /100 WBC   Scan Slide    Specimen: Arm, Left; Blood   Result Value Ref Range    RBC Morphology Normal Normal    WBC Morphology Normal Normal    Platelet Morphology Normal Normal        Problems Addressed this Visit        Cardiac and Vasculature    Hyperlipidemia    Relevant Orders    Comprehensive Metabolic Panel    CBC & Differential    Lipid Panel    Essential hypertension    Relevant Orders    Comprehensive Metabolic Panel    CBC & Differential    Lipid Panel    Hemoglobin A1c       Genitourinary and Reproductive     Screening for prostate cancer    Relevant Orders    PSA Screen       Health Encounters    Well adult exam - Primary     The patient is here for health maintenance visit.  Currently, the patient consumes a healthy diet and has an adequate exercise regimen.  Screening lab work is ordered.  Immunizations were reviewed today.  Advice and education was given regarding nutrition, aerobic exercise, routine dental evaluations, routine eye exams, reproductive health, cardiovascular risk reduction, sunscreen use, self skin examination (annual dermatology evaluations) and seatbelt use (general overall safety).  Further recommendations will be given if needed after lab evaluation.  Annual wellness evaluation is  recommended.           Relevant Orders    Comprehensive Metabolic Panel    CBC & Differential    Lipid Panel    PSA Screen    Hemoglobin A1c   Other Visit Diagnoses     Colon cancer screening        Relevant Orders    Cologuard - Stool, Per Rectum      Diagnoses       Codes Comments    Well adult exam    -  Primary ICD-10-CM: Z00.00  ICD-9-CM: V70.0     Essential hypertension     ICD-10-CM: I10  ICD-9-CM: 401.9     Mixed hyperlipidemia     ICD-10-CM: E78.2  ICD-9-CM: 272.2     Screening for prostate cancer     ICD-10-CM: Z12.5  ICD-9-CM: V76.44     Colon cancer screening     ICD-10-CM: Z12.11  ICD-9-CM: V76.51           Return in about 1 year (around 12/15/2023) for Annual.    Be Calderon MD  12/15/2022

## 2022-12-15 NOTE — PATIENT INSTRUCTIONS

## 2023-01-17 ENCOUNTER — TELEPHONE (OUTPATIENT)
Dept: FAMILY MEDICINE CLINIC | Facility: CLINIC | Age: 58
End: 2023-01-17
Payer: COMMERCIAL

## 2023-01-17 NOTE — TELEPHONE ENCOUNTER
----- Message from Be Calderon MD sent at 1/17/2023  1:12 PM EST -----  Please let the patient know that their Cologuard was negative.  Recommend routine repeat colorectal cancer screening in 3 years.

## 2023-03-03 NOTE — PROGRESS NOTES
Subjective:     Encounter Date:03/22/2023      Patient ID: Robin Whitlock is a 58 y.o.  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     REFERRING PHYSICIAN: Ward Arana MD  CURRENT PHYSICIAN:  Be Calderon MD  SLEEP PHYSICIAN: Robin Acosta MD, [facility].    Chief Complaint:   Chief Complaint   Patient presents with   • Hypertension   • Hyperlipidemia   • IHD     Problem List:  1. Chronic ischemic heart disease:  a. Remote apparent non ST elevation myocardial infarction with abnormal cardiac enzymes and diagnostic coronary arteriography with angioplasty and stent deployment x2 - data deficit, [place], December 2010.  b. Reported acceptable stress test - data deficit, August 2011.   c. Recurrent chest pain syndrome with diagnostic coronary arteriography and angioplasty and stent deployment - data deficit, September 2012.   d. Recurrent chest pain syndrome with acceptable Cardiolite GXT (100% PMHR; 89% of predicted exercise capacity) without ischemic EKG changes, marked ectopy, anginal type chest discomfort without myocardial scar/reversible ischemia with preserved LVEF (0.62) with continued medical therapy felt warranted, October 2014.   e. Residual class I symptoms with acceptable negative quantitative SPECT gated Cardiolite exercise stress test suggestive of low probability of significant residual focal obstructive coronary artery disease with preserved systolic left ventricular function (LVEF 0.68) following exercise to 93% predicted maximum heart rate and only 76% predicted exercise capacity consistent with mild-moderate physical deconditioning, January 2020.  f. Residual class I symptoms, December 2020, September 2021, July 2022, March 2023  2. Chronic hypertension - probable essential.   3. Dyslipidemia.   4. Chronic lower tract obstructive syndrome - probable BPH.  5. Intermittent depression.   6. Chronic tobacco use: Discontinued December 2015  and restarted spring 2017, Discontinued 10/27/17.  7. Severe sleep apnea per sleep study (January 2018) with recommendations for CPAP.  8. Prediabetes mellitus, hemoglobin A1c 5.75%, October 2019, 5.77% December 2021    Allergies   Allergen Reactions   • Wasp Venom Swelling     Swarmed as a child and told to avoid   • Sulfa Antibiotics Rash       Current Outpatient Medications   Medication Instructions   • amLODIPine (NORVASC) 2.5 MG tablet TAKE 1 TABLET DAILY   • aspirin 81 mg, Oral, Daily   • cholecalciferol (VITAMIN D3) 2,000 Units, Oral, Daily   • clopidogrel (PLAVIX) 75 MG tablet TAKE 1 TABLET DAILY   • ezetimibe (ZETIA) 10 MG tablet TAKE 1 TABLET DAILY   • isosorbide mononitrate (IMDUR) 30 MG 24 hr tablet TAKE 1 TABLET DAILY   • metoprolol tartrate (LOPRESSOR) 25 MG tablet TAKE 1 TABLET TWICE A DAY   • nitroglycerin (NITROSTAT) 0.4 mg, Sublingual, Every 5 Minutes PRN, Take no more than 3 doses in 15 minutes.    • rosuvastatin (CRESTOR) 40 MG tablet TAKE 1 TABLET DAILY   • tadalafil (CIALIS) 5 mg, Oral, Daily         HISTORY OF PRESENT ILLNESS:  The patient is here for 8-month follow-up.  The patient denies any chest pain, shortness of breath, palpitations, dizziness, presyncope, or syncope.  He has had some right hip pain and was felt to possibly be bursitis but there is also question whether it could be coming from his lumbar spine because he has some numbness in his right big toe.  He has had a couple steroid injections in his hip.  He gets about 10,000 steps per day at his job.  He had a couple elevated blood pressure readings recently at his primary care physician's office but normally it is not that high.  He has a blood pressure monitor at home that he can use to check this.  He will have repeat laboratory testing around December 2023.    ROS   All other systems reviewed and otherwise negative.      ECG 12 Lead    Date/Time: 3/22/2023 3:35 PM  Performed by: Crystal Vieira APRN  Authorized by: Isha  "YAKELIN Sarabia   Rhythm comments: Normal sinus rhythm, cannot rule out anterior infarct, age undetermined, abnormal ECG, 86 bpm,  ms, QTc 445 ms,  ms, no prior ECGs to review                 Objective:       Vitals:    03/22/23 1431 03/22/23 1440 03/22/23 1534   BP: 100/72 108/82 114/82   BP Location: Right arm Right arm Right arm   Patient Position: Sitting Standing Sitting   Pulse: 80 78    SpO2: 97% 97%    Weight: 113 kg (249 lb)     Height: 185.4 cm (72.99\")       Body mass index is 32.86 kg/m².  Last weight July 2022 was 246 pounds  Constitutional:       Appearance: Healthy appearance. Not in distress.   Neck:      Vascular: No JVR. JVD normal.   Pulmonary:      Effort: Pulmonary effort is normal.      Breath sounds: Normal breath sounds. No wheezing. No rhonchi. No rales.   Chest:      Chest wall: Not tender to palpatation.   Cardiovascular:      PMI at left midclavicular line. Normal rate. Regular rhythm. Normal S1. Normal S2.      Murmurs: There is a grade 1/6 systolic murmur at the LLSB.      No gallop. No click. No rub.   Pulses:     Dorsalis pedis: 2+ bilaterally.     Posterior tibial: 2+ bilaterally.  Edema:     Peripheral edema absent.   Abdominal:      General: Bowel sounds are normal.      Palpations: Abdomen is soft.      Tenderness: There is no abdominal tenderness.   Musculoskeletal: Normal range of motion.         General: No tenderness. Skin:     General: Skin is warm and dry.   Neurological:      General: No focal deficit present.      Mental Status: Alert and oriented to person, place and time.           Lab Review:   Lab Results   Component Value Date    GLUCOSE 100 (H) 12/15/2022    BUN 11 12/15/2022    CREATININE 1.09 12/15/2022    EGFRIFNONA 81 12/09/2021    BCR 10.1 12/15/2022    CO2 26.5 12/15/2022    CALCIUM 9.9 12/15/2022    ALBUMIN 4.60 12/15/2022    AST 39 12/15/2022    ALT 39 12/15/2022       Lab Results   Component Value Date    WBC 6.32 12/15/2022    HGB 14.8 " 12/15/2022    HCT 42.1 12/15/2022    MCV 92.5 12/15/2022     12/15/2022       Lab Results   Component Value Date    HGBA1C 5.70 (H) 12/15/2022       Lab Results   Component Value Date    TSH 3.100 10/20/2020       Lab Results   Component Value Date    CHOL 143 12/15/2022    CHOL 124 12/09/2021     Lab Results   Component Value Date    TRIG 119 12/15/2022    TRIG 133 12/09/2021     Lab Results   Component Value Date    HDL 48 12/15/2022    HDL 46 12/09/2021     Lab Results   Component Value Date    LDL 74 12/15/2022    LDL 55 12/09/2021     Advance Care Planning   ACP discussion was held with the patient during this visit. Patient does not have an advance directive, declines further assistance.             Assessment:     Overall continued acceptable course with no new interim cardiopulmonary complaints with good functional status. We will defer additional diagnostic or therapeutic intervention from a cardiac perspective at this time.       Diagnosis Plan   1. Ischemic heart disease  No recurrent angina pectoris or CHF on current activity schedule; continue current treatment      2. Essential hypertension   Controlled, continue current cardiac medications      3. Mixed hyperlipidemia   Acceptable lipid panel December 2022, continue rosuvastatin             Plan:         1. Patient to continue current medications and close follow up with the above providers.  2. Tentative cardiology follow up in September 2023 or patient may return sooner PRN.      Electronically signed by YAKELIN Waggoner, 03/22/23, 3:37 PM EDT.

## 2023-03-13 ENCOUNTER — OFFICE VISIT (OUTPATIENT)
Dept: FAMILY MEDICINE CLINIC | Facility: CLINIC | Age: 58
End: 2023-03-13
Payer: COMMERCIAL

## 2023-03-13 VITALS
BODY MASS INDEX: 33.95 KG/M2 | TEMPERATURE: 98.2 F | WEIGHT: 256.2 LBS | HEART RATE: 60 BPM | HEIGHT: 73 IN | SYSTOLIC BLOOD PRESSURE: 146 MMHG | OXYGEN SATURATION: 97 % | DIASTOLIC BLOOD PRESSURE: 94 MMHG

## 2023-03-13 DIAGNOSIS — Z12.83 SKIN CANCER SCREENING: ICD-10-CM

## 2023-03-13 DIAGNOSIS — G89.29 CHRONIC RIGHT HIP PAIN: Primary | ICD-10-CM

## 2023-03-13 DIAGNOSIS — M25.551 CHRONIC RIGHT HIP PAIN: Primary | ICD-10-CM

## 2023-03-13 PROCEDURE — 99213 OFFICE O/P EST LOW 20 MIN: CPT | Performed by: FAMILY MEDICINE

## 2023-03-13 NOTE — ASSESSMENT & PLAN NOTE
Hip pain ongoing since January.  I suspect greater trochanteric bursitis.  Order x-ray for further evaluation.  Patient will continue Tylenol as needed.  He cannot take NSAIDs due to being on a blood thinner.  Patient was given referral to physical therapy as well as orthopedic surgery for further evaluation and treatment.  RTC/ED precautions given

## 2023-03-13 NOTE — PROGRESS NOTES
Robin Whitlock is a 58 y.o. male who presents today for Hip Pain (Chronic right hip pain. Needs referral to dematology.)      Patient has had right sided lateral hip pain since January. He does get some feeling of pins and needles in the lower extremity occasionally. When he lays down the pain increases and it can cause difficulty sleeping. He states that it is an aching pain and is position dependent. Over all it has improved but has not resolved. He saw telehealth and was given a steroid dose pack and a muscle relaxer. He has been taking tylenol as well. He did a second telehealth visit at the end of January and was given a burst of steroids. He was using crutches to help alleviate pain with walking but he does not need these any longer. Since completing this his pain has been less but is stable and has not gone away. He denies back pain, fever, chills, weakness in the legs, injury, swelling, bruising, or changes in bladder/bowel habits.        Review of Systems   Constitutional: Negative for fever and unexpected weight loss.   HENT: Negative for congestion, ear pain and sore throat.    Eyes: Negative for visual disturbance.   Respiratory: Negative for cough, shortness of breath and wheezing.    Cardiovascular: Negative for chest pain and palpitations.   Gastrointestinal: Negative for abdominal pain, blood in stool, constipation, diarrhea, nausea, vomiting and GERD.   Endocrine: Negative for polydipsia and polyuria.   Genitourinary: Negative for difficulty urinating.   Musculoskeletal: Positive for arthralgias (right hip pain). Negative for joint swelling.   Skin: Negative for rash and skin lesions.   Allergic/Immunologic: Negative for environmental allergies.   Neurological: Negative for seizures and syncope.   Hematological: Does not bruise/bleed easily.   Psychiatric/Behavioral: Negative for suicidal ideas.        The following portions of the patient's history were reviewed and updated as appropriate:  "allergies, current medications, past family history, past medical history, past social history, past surgical history and problem list.    Current Outpatient Medications on File Prior to Visit   Medication Sig Dispense Refill   • amLODIPine (NORVASC) 2.5 MG tablet TAKE 1 TABLET DAILY 90 tablet 3   • aspirin 81 MG tablet Take 1 tablet by mouth Daily.     • cholecalciferol (VITAMIN D3) 1000 UNITS tablet Take 2 tablets by mouth Daily.     • clopidogrel (PLAVIX) 75 MG tablet TAKE 1 TABLET DAILY 90 tablet 3   • ezetimibe (ZETIA) 10 MG tablet TAKE 1 TABLET DAILY 90 tablet 3   • isosorbide mononitrate (IMDUR) 30 MG 24 hr tablet TAKE 1 TABLET DAILY 90 tablet 3   • metoprolol tartrate (LOPRESSOR) 25 MG tablet TAKE 1 TABLET TWICE A  tablet 3   • nitroglycerin (NITROSTAT) 0.4 MG SL tablet Place 1 tablet under the tongue Every 5 (Five) Minutes As Needed for Chest Pain. Take no more than 3 doses in 15 minutes. 25 tablet 2   • rosuvastatin (CRESTOR) 40 MG tablet TAKE 1 TABLET DAILY 90 tablet 3   • tadalafil (CIALIS) 5 MG tablet Take 1 tablet by mouth Daily. 90 tablet 10     No current facility-administered medications on file prior to visit.       Allergies   Allergen Reactions   • Wasp Venom Swelling     Swarmed as a child and told to avoid   • Sulfa Antibiotics Rash        Visit Vitals  /94   Pulse 60   Temp 98.2 °F (36.8 °C)   Ht 185.4 cm (73\")   Wt 116 kg (256 lb 3.2 oz)   SpO2 97%   BMI 33.80 kg/m²        Physical Exam  Constitutional:       General: He is not in acute distress.     Appearance: He is well-developed. He is not diaphoretic.   HENT:      Head: Atraumatic.   Cardiovascular:      Rate and Rhythm: Normal rate and regular rhythm.      Heart sounds: Normal heart sounds. No murmur heard.    No friction rub. No gallop.   Pulmonary:      Effort: Pulmonary effort is normal. No respiratory distress.      Breath sounds: Normal breath sounds. No stridor. No wheezing, rhonchi or rales.   Musculoskeletal:      " Cervical back: Normal range of motion and neck supple.      Right hip: Normal.      Left hip: Bony tenderness (mild tenderness over greater trochanter) present. No deformity, tenderness or crepitus. Normal range of motion. Normal strength.   Skin:     General: Skin is warm and dry.   Neurological:      Mental Status: He is alert and oriented to person, place, and time.   Psychiatric:         Behavior: Behavior normal.          Results for orders placed or performed in visit on 12/15/22   Cologuard - Stool, Per Rectum    Specimen: Per Rectum; Stool   Result Value Ref Range    Cologuard Negative Negative        Problems Addressed this Visit        Musculoskeletal and Injuries    Chronic right hip pain - Primary     Hip pain ongoing since January.  I suspect greater trochanteric bursitis.  Order x-ray for further evaluation.  Patient will continue Tylenol as needed.  He cannot take NSAIDs due to being on a blood thinner.  Patient was given referral to physical therapy as well as orthopedic surgery for further evaluation and treatment.  RTC/ED precautions given         Relevant Orders    XR Hip With or Without Pelvis 2 - 3 View Right    Ambulatory Referral to Orthopedic Surgery    Ambulatory Referral to Physical Therapy Evaluate and treat   Other Visit Diagnoses     Skin cancer screening        Relevant Orders    Ambulatory Referral to Dermatology      Diagnoses       Codes Comments    Chronic right hip pain    -  Primary ICD-10-CM: M25.551, G89.29  ICD-9-CM: 719.45, 338.29     Skin cancer screening     ICD-10-CM: Z12.83  ICD-9-CM: V76.43           Return if symptoms worsen or fail to improve.    Be Calderon MD   3/13/2023

## 2023-03-21 ENCOUNTER — OFFICE VISIT (OUTPATIENT)
Dept: ORTHOPEDIC SURGERY | Facility: CLINIC | Age: 58
End: 2023-03-21
Payer: COMMERCIAL

## 2023-03-21 ENCOUNTER — TREATMENT (OUTPATIENT)
Dept: PHYSICAL THERAPY | Facility: CLINIC | Age: 58
End: 2023-03-21
Payer: COMMERCIAL

## 2023-03-21 VITALS
SYSTOLIC BLOOD PRESSURE: 144 MMHG | HEIGHT: 73 IN | DIASTOLIC BLOOD PRESSURE: 74 MMHG | WEIGHT: 256.2 LBS | BODY MASS INDEX: 33.95 KG/M2

## 2023-03-21 DIAGNOSIS — M25.551 CHRONIC RIGHT HIP PAIN: Primary | ICD-10-CM

## 2023-03-21 DIAGNOSIS — M16.11 PRIMARY OSTEOARTHRITIS OF RIGHT HIP: ICD-10-CM

## 2023-03-21 DIAGNOSIS — M70.61 TROCHANTERIC BURSITIS OF RIGHT HIP: Primary | ICD-10-CM

## 2023-03-21 DIAGNOSIS — G89.29 CHRONIC RIGHT HIP PAIN: Primary | ICD-10-CM

## 2023-03-21 PROCEDURE — 20610 DRAIN/INJ JOINT/BURSA W/O US: CPT | Performed by: ORTHOPAEDIC SURGERY

## 2023-03-21 PROCEDURE — 97110 THERAPEUTIC EXERCISES: CPT | Performed by: PHYSICAL THERAPIST

## 2023-03-21 PROCEDURE — 97162 PT EVAL MOD COMPLEX 30 MIN: CPT | Performed by: PHYSICAL THERAPIST

## 2023-03-21 PROCEDURE — 99204 OFFICE O/P NEW MOD 45 MIN: CPT | Performed by: ORTHOPAEDIC SURGERY

## 2023-03-21 RX ORDER — BUPIVACAINE HYDROCHLORIDE 2.5 MG/ML
3 INJECTION, SOLUTION EPIDURAL; INFILTRATION; INTRACAUDAL
Status: COMPLETED | OUTPATIENT
Start: 2023-03-21 | End: 2023-03-21

## 2023-03-21 RX ORDER — TRIAMCINOLONE ACETONIDE 40 MG/ML
80 INJECTION, SUSPENSION INTRA-ARTICULAR; INTRAMUSCULAR
Status: COMPLETED | OUTPATIENT
Start: 2023-03-21 | End: 2023-03-21

## 2023-03-21 RX ORDER — LIDOCAINE HYDROCHLORIDE 10 MG/ML
3 INJECTION, SOLUTION EPIDURAL; INFILTRATION; INTRACAUDAL; PERINEURAL
Status: COMPLETED | OUTPATIENT
Start: 2023-03-21 | End: 2023-03-21

## 2023-03-21 RX ADMIN — BUPIVACAINE HYDROCHLORIDE 3 ML: 2.5 INJECTION, SOLUTION EPIDURAL; INFILTRATION; INTRACAUDAL at 10:30

## 2023-03-21 RX ADMIN — LIDOCAINE HYDROCHLORIDE 3 ML: 10 INJECTION, SOLUTION EPIDURAL; INFILTRATION; INTRACAUDAL; PERINEURAL at 10:30

## 2023-03-21 RX ADMIN — TRIAMCINOLONE ACETONIDE 80 MG: 40 INJECTION, SUSPENSION INTRA-ARTICULAR; INTRAMUSCULAR at 10:30

## 2023-03-21 NOTE — PROGRESS NOTES
Orthopaedic Clinic Note: Hip New Patient    Chief Complaint   Patient presents with   • Right Hip - Pain        HPI  Consult from: Be Calderon MD    Robin Whitlock is a 58 y.o. male who presents with right hip pain for 2 month(s). Onset atraumatic and gradual in nature. Pain is localized to lateral trochanter and is a 4/10 on the pain scale.Pain is described as aching. Associated symptoms include pain and stiffness.  The pain is worse with walking, sleeping, lying on affected side and rising from seated position; sitting and pain medication and/or NSAID improve the pain. Previous treatments have included: cane/walker, NSAIDS and oral steroids since symptom onset. Although some transient relief was reported with these interventions, these conservative measures have failed and symptoms have persisted. The patient is limited in daily activities and has had a significant decrease in quality of life as a result. He denies fevers, chills, or constitutional symptoms.    I have reviewed the following portions of the patient's history:History of Present Illness    Past Medical History:   Diagnosis Date   • BPH (benign prostatic hypertrophy)    • Broken arm 2013   • CAD (coronary artery disease)    • Cystic acne    • Depression    • ED (erectile dysfunction) of non-organic origin    • History of myocardial infarction    • Hyperlipidemia    • Hypertension 12/01/2010   • Myocardial infarction (HCC) 12/2010   • Neuromuscular disorder (HCC) 1/2023   • Obesity 12/01/2010   • [place] (obstructive sleep apnea)       Past Surgical History:   Procedure Laterality Date   • CARDIAC CATHETERIZATION  2012   • COLONOSCOPY  2017   • CORONARY STENT PLACEMENT  2012      Family History   Problem Relation Age of Onset   • Heart attack Father    • Multiple sclerosis Father    • Heart failure Father         mutiple heart attacks   • Lung cancer Father    • Cancer Father         multple cancers   • Heart disease Father    • Heart  failure Mother         mutiple heart attacks   • Heart attack Mother    • Lung cancer Mother    • Cancer Mother         lung cancer   • Heart disease Mother    • No Known Problems Sister      Social History     Socioeconomic History   • Marital status:      Spouse name: N/A   • Number of children: 0   • Years of education: College   • Highest education level: Bachelor's degree (e.g., BA, AB, BS)   Tobacco Use   • Smoking status: Former     Packs/day: 0.50     Years: 27.00     Pack years: 13.50     Types: Cigarettes     Start date: 3/29/2017     Quit date: 10/27/2017     Years since quittin.4   • Smokeless tobacco: Never   Vaping Use   • Vaping Use: Never used   Substance and Sexual Activity   • Alcohol use: Yes     Alcohol/week: 46.0 standard drinks     Types: 6 Cans of beer, 40 Shots of liquor per week     Comment: often   • Drug use: No   • Sexual activity: Yes     Partners: Female     Birth control/protection: Pill     Comment: wife only      Current Outpatient Medications on File Prior to Visit   Medication Sig Dispense Refill   • amLODIPine (NORVASC) 2.5 MG tablet TAKE 1 TABLET DAILY 90 tablet 3   • aspirin 81 MG tablet Take 1 tablet by mouth Daily.     • cholecalciferol (VITAMIN D3) 1000 UNITS tablet Take 2 tablets by mouth Daily.     • clopidogrel (PLAVIX) 75 MG tablet TAKE 1 TABLET DAILY 90 tablet 3   • ezetimibe (ZETIA) 10 MG tablet TAKE 1 TABLET DAILY 90 tablet 3   • isosorbide mononitrate (IMDUR) 30 MG 24 hr tablet TAKE 1 TABLET DAILY 90 tablet 3   • metoprolol tartrate (LOPRESSOR) 25 MG tablet TAKE 1 TABLET TWICE A  tablet 3   • nitroglycerin (NITROSTAT) 0.4 MG SL tablet Place 1 tablet under the tongue Every 5 (Five) Minutes As Needed for Chest Pain. Take no more than 3 doses in 15 minutes. 25 tablet 2   • rosuvastatin (CRESTOR) 40 MG tablet TAKE 1 TABLET DAILY 90 tablet 3   • tadalafil (CIALIS) 5 MG tablet Take 1 tablet by mouth Daily. 90 tablet 10     No current facility-administered  "medications on file prior to visit.      Allergies   Allergen Reactions   • Wasp Venom Swelling     Swarmed as a child and told to avoid   • Sulfa Antibiotics Rash        Review of Systems   Constitutional: Negative.    HENT: Negative.    Eyes: Negative.    Respiratory: Negative.    Cardiovascular: Negative.    Gastrointestinal: Negative.    Endocrine: Negative.    Genitourinary: Negative.    Musculoskeletal: Positive for arthralgias.   Skin: Negative.    Allergic/Immunologic: Negative.    Neurological: Negative.    Hematological: Negative.    Psychiatric/Behavioral: Negative.         The patient's Review of Systems was personally reviewed and confirmed as accurate.    The following portions of the patient's history were reviewed and updated as appropriate: allergies, current medications, past family history, past medical history, past social history, past surgical history and problem list.    Physical Exam  Blood pressure 144/74, height 185.4 cm (72.99\"), weight 116 kg (256 lb 3.2 oz).    Body mass index is 33.81 kg/m².    GENERAL APPEARANCE: awake, alert & oriented x 3, in no acute distress and well developed, well nourished  PSYCH: normal affect  LUNGS:  breathing nonlabored  EYES: sclera anicteric  CARDIOVASCULAR: palpable dorsalis pedis, palpable posterior tibial bilaterally. Capillary refill less than 2 seconds  EXTREMITIES: no clubbing, cyanosis  GAIT:  Normal           Right Hip Exam:  RANGE OF MOTION:   FLEXION CONTRACTURE: None   FLEXION: 110 degrees   INTERNAL ROTATION: 20 degrees at 90 degrees of flexion   EXTERNAL ROTATION: 40 degrees at 90 degrees of flexion    PAIN WITH HIP MOTION: no  PAIN WITH LOGROLL: no  STINCHFIELD TEST: negative    KNEE EXAM: full knee ROM (0-120 degrees), stable to varus and valgus stress at terminal extension and 30 degrees flexion     STRENGTH:  5/5 hip adduction, abduction, flexion. 5/5 strength knee flexion, extension. 5/5 strength ankle dorsiflexion and plantarflexion. "     GREATER TROCHANTER BURSAL PAIN:  yes     REFLEXES:   PATELLAR 2+/4   ACHILLES 2+/4    CLONUS: negative  STRAIGHT LEG TEST:   negative    SENSATION TO LIGHT TOUCH:  DEEP PERONEAL/SUPERFICIAL PERONEAL/SURAL/SAPHENOUS/TIBIAL:  intact    EDEMA:   no  ERYTHEMA:  no  WOUNDS/INCISIONS: no overlying skin problems.      Left Hip Exam:   RANGE OF MOTION:   FLEXION CONTRACTURE: None   FLEXION: 110 degrees   INTERNAL ROTATION: 20 degrees at 90 degrees of flexion   EXTERNAL ROTATION: 40 degrees at 90 degrees of flexion    PAIN WITH HIP MOTION: no  PAIN WITH LOGROLL: no  STINCHFIELD TEST: negative    KNEE EXAM: full knee ROM (0-120 degrees), stable to varus and valgus stress at terminal extension and 30 degrees flexion     STRENGTH:  5/5 hip adduction, abduction, flexion. 5/5 strength knee flexion, extension. 5/5 strength ankle dorsiflexion and plantarflexion.     GREATER TROCHANTER BURSAL PAIN:  no     REFLEXES:   PATELLAR 2+/4   ACHILLES 2+/4    CLONUS: negative  STRAIGHT LEG TEST:   negative    SENSATION TO LIGHT TOUCH:  DEEP PERONEAL/SUPERFICIAL PERONEAL/SURAL/SAPHENOUS/TIBIAL:  intact    EDEMA:   no  ERYTHEMA:  no  WOUNDS/INCISIONS: no overlying skin problems.      ------------------------------------------------------------------    LEG LENGTHS:  equal  _____________________________________________________  _____________________________________________________    RADIOGRAPHIC FINDINGS:   Right hip radiographs from 3/13/2023 were personally interpreted.  Radiographs demonstrate mild arthritic change of the bilateral hips with preservation of joint spaces.  No significant joint space narrowing or osteophyte formation.  No acute bony injury or fracture.    Assessment/Plan:   Diagnosis Plan   1. Trochanteric bursitis of right hip        2. Primary osteoarthritis of right hip          Patient has mild asymptomatic hip arthritis of the right hip.  His pain is localized lateral trochanter.  He is suffering from trochanteric  bursitis.  Discussed treatment options.  He is agreeable to trochanteric bursa cortisone injection today.  He will follow-up as needed.    Procedure Note:  I discussed with the patient the potential benefits of performing a therapeutic injection of the right hip trochanteric bursa as well as potential risks including but not limited to infection, swelling, pain, bleeding, bruising, nerve/vessel damage, skin color changes, transient elevation in blood glucose levels, and fat atrophy. After informed consent and verifying correct patient, procedure site, and type of procedure, the area was prepped with alcohol, ethyl chloride was used to numb the skin. Via the direct lateral approach, 3 cc of 1% lidocaine, 3 cc of 0.25% Marcaine and 2 cc of 40mg/ml of Kenalog were injected into the right hip trochanteric bursa. The patient tolerated the procedure well. There were no complications. A sterile dressing was placed over the injection site.      Mathew Young MD  03/21/23  10:30 EDT

## 2023-03-21 NOTE — PROGRESS NOTES
Procedure   Large Joint Arthrocentesis: R greater trochanteric bursa  Date/Time: 3/21/2023 10:30 AM  Consent given by: patient  Site marked: site marked  Timeout: Immediately prior to procedure a time out was called to verify the correct patient, procedure, equipment, support staff and site/side marked as required   Supporting Documentation  Indications: pain   Procedure Details  Location: hip - R greater trochanteric bursa  Preparation: Patient was prepped and draped in the usual sterile fashion  Needle size: 22 G  Approach: lateral  Medications administered: 3 mL bupivacaine (PF) 0.25 %; 3 mL lidocaine PF 1% 1 %; 80 mg triamcinolone acetonide 40 MG/ML  Patient tolerance: patient tolerated the procedure well with no immediate complications

## 2023-03-21 NOTE — PROGRESS NOTES
Physical Therapy Initial Evaluation and Plan of Care  [address], [address], [place] [id number]    Patient: Robin Whitlock   : 1965  Diagnosis/ICD-10 Code:  Chronic right hip pain [M25.551, G89.29]  Referring practitioner: Be Calderon MD  Date of Initial Visit: 3/21/2023  Today's Date: 3/21/2023  Patient seen for 1 session         Visit Diagnoses:    ICD-10-CM ICD-9-CM   1. Chronic right hip pain  M25.551 719.45    G89.29 338.29         Subjective Questionnaire: LEFS: 65/80      Subjective Evaluation    History of Present Illness  Date of onset: 2023  Mechanism of injury: Patient awoke with stiff and painful right lateral hip pain , had to work hard to get the limp improving afterwards.  Notes stiffness for a few days with progressive worsening after about 6 days.  He ended up needing ambulatory aids to help him walk.  He did a telehealth and was put on steroid taper and mm relaxer and had relief until he finished the taper.  Then was put on a higher dose of steroids that helped to relieve the pain but he still notes mild pain and sleep interference and stiffness in general. Managing with tylenol.  Patient notes right foot pins and needles sensations in the foot and toes from the medial aspect of the lower leg to the foot.        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  Tender with PA pressures along the lower lumbar segments.  No significant lower leg tenderness.     Neurological Testing      Sensation     Lumbar   Left   Intact: light touch    Right   Diminished: light touch    Comments   Right light touch: medial and dorsal foot    Reflexes   Left   Patellar (L4): trace (1+)  Achilles (S1): absent (0)  Clonus sign: negative    Right   Patellar (L4): normal (2+)  Achilles (S1): absent (0)  Clonus sign: negative    Active Range of Motion     Lumbar   Flexion: WFL  Extension: WFL  Left lateral flexion: WFL  Right lateral flexion: WFL and with pain    Strength/Myotome Testing     Lumbar   Left   Toe walk: normal    Right   Toe walk: normal    Left Hip   Planes of Motion   Flexion: 4+  Extension: 3    Right Hip   Planes of Motion   Flexion: 4+  Extension: 3    Left Knee   Flexion: 5  Extension: 5    Right Knee   Flexion: 5  Extension: 5    Left Ankle/Foot   Dorsiflexion: 5  Great toe extension: 4+    Right Ankle/Foot   Dorsiflexion: 3+  Great toe extension: 3    Tests     Lumbar   Positive repeated extension.     Right   Positive passive SLR (60 with postiive DF sign).           Assessment & Plan     Assessment  Impairments: abnormal gait, abnormal or restricted ROM, activity intolerance, impaired balance, impaired physical strength, lacks appropriate home exercise program, pain with function, safety issue and weight-bearing intolerance  Functional Limitations: carrying objects, lifting, walking, pulling, pushing, uncomfortable because of pain, moving in bed, sitting, standing, stooping and unable to perform repetitive tasks  Assessment details: Patient with recent onset right leg pain since waking up in early January.  Symptoms are most consistent with L5 radiculopathy with loss of hallux extensor strength and sensory changes in the leg.  He was injected in his trochanteric hip region earlier today.  Patient's symptoms responded favorably to extension and we initiated extension based exercises today.  Patient to benefit from skilled PT to restore normal function and tolerance to travel for work.      Barriers to therapy: work travel schedule.  Prognosis: good    Goals  Plan Goals: Short Term Goals (2 weeks)  1. Patient to be compliant with initial HEP  2.  Patient to report pain less than or equal to 2/10 at worst  3.  Patient to report decreased pain with sleeping.    Long Term Goals (4-6 weeks)  1. Patient to demonstrate appropriate lifting mechanics floor to waist with at least 25#.  2. Patient to demonstrate decreased irritability with PA pressures along lower lumbar segments  3. Patient to demonstrate independence with home exercises.  4. Patient to report >75% reduction in RLE symptoms.        Plan  Therapy options: will be seen for skilled therapy services  Planned modality interventions: electrical stimulation/Russian stimulation, TENS, ultrasound and traction  Planned therapy interventions: manual therapy, therapeutic activities, postural training, body mechanics training, functional ROM exercises, flexibility, gait training, stretching, strengthening, joint mobilization, neuromuscular re-education, soft tissue mobilization and spinal/joint mobilization  Frequency: 2x week  Duration in visits: 12  Treatment plan discussed with: patient  Plan details: 1-2 x wk up to 8 weeks, working around his work travel schedule.         History # of Personal Factors and/or Comorbidities: HIGH (3+)  Examination of Body System(s): # of elements: MODERATE (3)  Clinical Presentation: STABLE   Clinical Decision Making: MODERATE      Timed:         Manual Therapy:    0     mins  19960;     Therapeutic Exercise:    13     mins  39120;     Neuromuscular Bo:    0    mins  50495;    Therapeutic Activity:     0     mins  33324;     Gait Trainin     mins  55650;     Ultrasound:     0     mins  02492;    Ionto                               0    mins   65329  Self Care                       0     mins   97506  Canalith Repos    0     mins 87681      Un-Timed:  Electrical Stimulation:    0     mins  25489 (  );  Dry Needling     0     mins self-pay  Traction     0     mins 58460  Low Eval     0     Mins  66163  Mod Eval     27     Mins  31362  High Eval                       0     Mins  85756        Timed Treatment:   13   mins   Total Treatment:     40   mins          PT: Krishna Arias, PT     License Number: [id number]    Electronically signed by Krishna Arias, PT, 03/21/23, 3:08 PM EDT    Certification Period: 3/21/2023 thru 6/18/2023  I certify that the therapy services are furnished while this patient is under my care.  The services outlined above are required by this patient, and will be reviewed every 90 days.         Physician Signature:__________________________________________________    PHYSICIAN: Be Calderon MD  NPI: [id number]      DATE:     Please sign and return via fax to [email or web address] . Thank you, [facility].

## 2023-03-22 ENCOUNTER — OFFICE VISIT (OUTPATIENT)
Dept: CARDIOLOGY | Facility: CLINIC | Age: 58
End: 2023-03-22
Payer: COMMERCIAL

## 2023-03-22 VITALS
OXYGEN SATURATION: 97 % | SYSTOLIC BLOOD PRESSURE: 114 MMHG | BODY MASS INDEX: 33 KG/M2 | HEIGHT: 73 IN | WEIGHT: 249 LBS | DIASTOLIC BLOOD PRESSURE: 82 MMHG | HEART RATE: 78 BPM

## 2023-03-22 DIAGNOSIS — E78.2 MIXED HYPERLIPIDEMIA: ICD-10-CM

## 2023-03-22 DIAGNOSIS — I25.9 ISCHEMIC HEART DISEASE: Primary | ICD-10-CM

## 2023-03-22 DIAGNOSIS — I10 ESSENTIAL HYPERTENSION: ICD-10-CM

## 2023-03-22 PROCEDURE — 99214 OFFICE O/P EST MOD 30 MIN: CPT | Performed by: NURSE PRACTITIONER

## 2023-03-22 PROCEDURE — 93000 ELECTROCARDIOGRAM COMPLETE: CPT | Performed by: NURSE PRACTITIONER

## 2023-03-24 ENCOUNTER — TREATMENT (OUTPATIENT)
Dept: PHYSICAL THERAPY | Facility: CLINIC | Age: 58
End: 2023-03-24
Payer: COMMERCIAL

## 2023-03-24 DIAGNOSIS — G89.29 CHRONIC RIGHT HIP PAIN: Primary | ICD-10-CM

## 2023-03-24 DIAGNOSIS — M25.551 CHRONIC RIGHT HIP PAIN: Primary | ICD-10-CM

## 2023-03-24 PROCEDURE — 97110 THERAPEUTIC EXERCISES: CPT | Performed by: PHYSICAL THERAPIST

## 2023-03-24 PROCEDURE — 97112 NEUROMUSCULAR REEDUCATION: CPT | Performed by: PHYSICAL THERAPIST

## 2023-03-24 NOTE — PROGRESS NOTES
"Physical Therapy Daily Treatment Note  [address], [address], [facility] [address]      Patient: Robin Whitlock   : 1965  Referring practitioner: Be Calderon MD  Date of Initial Visit: Type: THERAPY  Noted: 3/21/2023  Today's Date: 3/24/2023  Patient seen for 2 sessions       Visit Diagnoses:    ICD-10-CM ICD-9-CM   1. Chronic right hip pain  M25.551 719.45    G89.29 338.29       Subjective   Patient reports that he may have had some mild improvements intermittently in the ability to extend the toes.  He notes that his hip has been better but still has some intermittent tightness/tenderness.  Occasionally he has a cold or 'waking up\" sensation in the foot.  his pain level is 4/10 upon arrival.      Objective   RLE hallux ext 3+/5    See Exercise, Manual, and Modality Logs for complete treatment.       Assessment & Plan     Assessment    Assessment details: Patient tolerates treatment well but symptom changes were intermittent and short in duration.  He only has had a few days of exercises.  It is most discouraging with strength loss in hallux extension.  We added femoral nerve dynamics and piriformis stretching to his HEP.  He is travelling next week and will return to care in one week.           Timed:         Manual Therapy:    0     mins  66789;     Therapeutic Exercise:    40     mins  66466;     Neuromuscular Bo:    10    mins  77504;    Therapeutic Activity:     0     mins  22547;     Gait Trainin     mins  35103;     Ultrasound:     0     mins  69773;    Ionto                               0    mins   49964  Self Care                       0     mins   78938  Canalith Repos    0     mins 65277      Un-Timed:  Electrical Stimulation:    0     mins  77233 ( );  Dry Needling     0     mins self-pay  Traction     0     mins 28252      Timed Treatment:   50   mins   Total Treatment:     50   mins    Krishna Arias, PT  [place] License: [id number]      "

## 2023-03-31 ENCOUNTER — TREATMENT (OUTPATIENT)
Dept: PHYSICAL THERAPY | Facility: CLINIC | Age: 58
End: 2023-03-31
Payer: COMMERCIAL

## 2023-03-31 DIAGNOSIS — M25.551 CHRONIC RIGHT HIP PAIN: Primary | ICD-10-CM

## 2023-03-31 DIAGNOSIS — G89.29 CHRONIC RIGHT HIP PAIN: Primary | ICD-10-CM

## 2023-03-31 PROCEDURE — 97110 THERAPEUTIC EXERCISES: CPT | Performed by: PHYSICAL THERAPIST

## 2023-03-31 NOTE — PROGRESS NOTES
Physical Therapy Daily Treatment Note  [address], [address], [facility] [address]      Patient: Robin Whitlock   : 1965  Referring practitioner: Be Calderon MD  Date of Initial Visit: Type: THERAPY  Noted: 3/21/2023  Today's Date: 3/31/2023  Patient seen for 3 sessions       Visit Diagnoses:    ICD-10-CM ICD-9-CM   1. Chronic right hip pain  M25.551 719.45    G89.29 338.29       Subjective   Patient reports that he has noted some improvements in his pain and his ROM over the last week.  He feels he is doing better.  He is sleeping and resting better as well.  his pain level is 2-3/10 upon arrival.      Objective   RLE hallux ext 3-3+/5    See Exercise, Manual, and Modality Logs for complete treatment.       Assessment & Plan     Assessment    Assessment details: Patient's hallux ext ability is rather consistent.  We went ahead and initiated hip strengthening and added to his HEP as he is travelling the next week.           Timed:         Manual Therapy:    0     mins  70982;     Therapeutic Exercise:    39     mins  32994;     Neuromuscular Bo:    0    mins  06069;    Therapeutic Activity:     0     mins  86413;     Gait Trainin     mins  44178;     Ultrasound:     0     mins  92328;    Ionto                               0    mins   60488  Self Care                       0     mins   64148  Canalith Repos    0     mins 50343      Un-Timed:  Electrical Stimulation:    0     mins  87887 ( );  Dry Needling     0     mins self-pay  Traction     0     mins 07047      Timed Treatment:   39   mins   Total Treatment:     39   mins    Krishna Arias, PT  [place] License: [id number]

## 2023-04-10 ENCOUNTER — TREATMENT (OUTPATIENT)
Dept: PHYSICAL THERAPY | Facility: CLINIC | Age: 58
End: 2023-04-10
Payer: COMMERCIAL

## 2023-04-10 DIAGNOSIS — M25.551 CHRONIC RIGHT HIP PAIN: Primary | ICD-10-CM

## 2023-04-10 DIAGNOSIS — G89.29 CHRONIC RIGHT HIP PAIN: Primary | ICD-10-CM

## 2023-04-10 PROCEDURE — 97140 MANUAL THERAPY 1/> REGIONS: CPT | Performed by: PHYSICAL THERAPIST

## 2023-04-10 PROCEDURE — 97110 THERAPEUTIC EXERCISES: CPT | Performed by: PHYSICAL THERAPIST

## 2023-04-10 PROCEDURE — 97112 NEUROMUSCULAR REEDUCATION: CPT | Performed by: PHYSICAL THERAPIST

## 2023-04-10 NOTE — PROGRESS NOTES
Physical Therapy Daily Treatment Note  [address] [facility], [address], [facility] [address]      Patient: Robin Whitlock   : 1965  Referring practitioner: Be Calderon MD  Date of Initial Visit: Type: THERAPY  Noted: 3/21/2023  Today's Date: 4/10/2023  Patient seen for 4 sessions       Visit Diagnoses:    ICD-10-CM ICD-9-CM   1. Chronic right hip pain  M25.551 719.45    G89.29 338.29       Subjective   Patient reports continued improvements over the last week, despite working 70 hours/wk.  He has began to notice improved sensation in the foot/hallux region.  He notes that his pain is better and he rarely experiences any symptoms in the lower leg.  his pain level is 0/10 upon arrival.      Objective   See Exercise, Manual, and Modality Logs for complete treatment.       Assessment & Plan     Assessment    Assessment details: Patient is progressing rather well.  He is improving his ability to extend the large toe and has localizing symptoms to primarily the dorsal aspect of the foot.  He has been compliant and is making good progress.  We progressed hip abduction strengthening today and will plan to follow up at least two more times before travelling for work for another extended period of time.           Timed:         Manual Therapy:    9     mins  58009;     Therapeutic Exercise:    25     mins  86085;     Neuromuscular Bo:    10    mins  33916;    Therapeutic Activity:     0     mins  57066;     Gait Trainin     mins  70569;     Ultrasound:     0     mins  28194;    Ionto                               0    mins   61981  Self Care                       0     mins   29520  Canalith Repos    0     mins 12758      Un-Timed:  Electrical Stimulation:    0     mins  48338 ( );  Dry Needling     0     mins self-pay  Traction     0     mins 77365      Timed Treatment:   44   mins   Total Treatment:     44   mins    Krishna Arias, PT  [place] License: [id number]

## 2023-04-21 DIAGNOSIS — N40.1 BENIGN NON-NODULAR PROSTATIC HYPERPLASIA WITH LOWER URINARY TRACT SYMPTOMS: ICD-10-CM

## 2023-04-21 RX ORDER — TADALAFIL 5 MG/1
5 TABLET ORAL DAILY
Qty: 90 TABLET | Refills: 10 | Status: SHIPPED | OUTPATIENT
Start: 2023-04-21

## 2023-04-21 NOTE — TELEPHONE ENCOUNTER
Rx Refill Note  Requested Prescriptions     Pending Prescriptions Disp Refills   • tadalafil (CIALIS) 5 MG tablet 90 tablet 10     Sig: Take 1 tablet by mouth Daily.      Last office visit with prescribing clinician: 3/13/2023   Last telemedicine visit with prescribing clinician: Visit date not found   Next office visit with prescribing clinician: 12/21/2023                         Would you like a call back once the refill request has been completed: [] Yes [] No    If the office needs to give you a call back, can they leave a voicemail: [] Yes [] No    Kaci Moses MA  04/21/23, 08:56 EDT

## 2023-08-17 DIAGNOSIS — I25.10 CORONARY ARTERY DISEASE DUE TO LIPID RICH PLAQUE: ICD-10-CM

## 2023-08-17 DIAGNOSIS — I25.83 CORONARY ARTERY DISEASE DUE TO LIPID RICH PLAQUE: ICD-10-CM

## 2023-08-17 NOTE — TELEPHONE ENCOUNTER
Rx Refill Note  Requested Prescriptions     Pending Prescriptions Disp Refills    metoprolol tartrate (LOPRESSOR) 25 MG tablet 180 tablet 3     Sig: Take 1 tablet by mouth 2 (Two) Times a Day.      Last office visit with prescribing clinician: 3/13/2023   Last telemedicine visit with prescribing clinician: Visit date not found   Next office visit with prescribing clinician: 12/21/2023                         Would you like a call back once the refill request has been completed: [] Yes [] No    If the office needs to give you a call back, can they leave a voicemail: [] Yes [] No    Bob Downs MA  08/17/23, 10:36 EDT

## 2023-08-19 DIAGNOSIS — I25.83 CORONARY ARTERY DISEASE DUE TO LIPID RICH PLAQUE: ICD-10-CM

## 2023-08-19 DIAGNOSIS — I25.10 CORONARY ARTERY DISEASE DUE TO LIPID RICH PLAQUE: ICD-10-CM

## 2023-08-21 RX ORDER — AMLODIPINE BESYLATE 2.5 MG/1
2.5 TABLET ORAL DAILY
Qty: 90 TABLET | Refills: 3 | Status: SHIPPED | OUTPATIENT
Start: 2023-08-21

## 2023-08-21 RX ORDER — CLOPIDOGREL BISULFATE 75 MG/1
75 TABLET ORAL DAILY
Qty: 90 TABLET | Refills: 3 | Status: SHIPPED | OUTPATIENT
Start: 2023-08-21

## 2023-08-21 RX ORDER — EZETIMIBE 10 MG/1
10 TABLET ORAL DAILY
Qty: 90 TABLET | Refills: 3 | Status: SHIPPED | OUTPATIENT
Start: 2023-08-21

## 2023-08-21 RX ORDER — ISOSORBIDE MONONITRATE 30 MG/1
30 TABLET, EXTENDED RELEASE ORAL DAILY
Qty: 90 TABLET | Refills: 3 | Status: SHIPPED | OUTPATIENT
Start: 2023-08-21

## 2023-08-21 RX ORDER — ROSUVASTATIN CALCIUM 40 MG/1
40 TABLET, COATED ORAL DAILY
Qty: 90 TABLET | Refills: 3 | Status: SHIPPED | OUTPATIENT
Start: 2023-08-21

## 2023-12-21 ENCOUNTER — OFFICE VISIT (OUTPATIENT)
Dept: FAMILY MEDICINE CLINIC | Facility: CLINIC | Age: 58
End: 2023-12-21
Payer: COMMERCIAL

## 2023-12-21 ENCOUNTER — LAB (OUTPATIENT)
Dept: LAB | Facility: HOSPITAL | Age: 58
End: 2023-12-21
Payer: COMMERCIAL

## 2023-12-21 VITALS
BODY MASS INDEX: 33.98 KG/M2 | RESPIRATION RATE: 18 BRPM | WEIGHT: 256.4 LBS | DIASTOLIC BLOOD PRESSURE: 74 MMHG | TEMPERATURE: 98.8 F | HEIGHT: 73 IN | HEART RATE: 59 BPM | SYSTOLIC BLOOD PRESSURE: 110 MMHG

## 2023-12-21 DIAGNOSIS — Z12.5 SCREENING FOR PROSTATE CANCER: ICD-10-CM

## 2023-12-21 DIAGNOSIS — I10 ESSENTIAL HYPERTENSION: ICD-10-CM

## 2023-12-21 DIAGNOSIS — E78.2 MIXED HYPERLIPIDEMIA: ICD-10-CM

## 2023-12-21 DIAGNOSIS — Z00.00 WELL ADULT EXAM: Primary | ICD-10-CM

## 2023-12-21 DIAGNOSIS — Z00.00 WELL ADULT EXAM: ICD-10-CM

## 2023-12-21 LAB
ALBUMIN SERPL-MCNC: 4.2 G/DL (ref 3.5–5.2)
ALBUMIN/GLOB SERPL: 1.4 G/DL
ALP SERPL-CCNC: 76 U/L (ref 39–117)
ALT SERPL W P-5'-P-CCNC: 48 U/L (ref 1–41)
ANION GAP SERPL CALCULATED.3IONS-SCNC: 11 MMOL/L (ref 5–15)
AST SERPL-CCNC: 42 U/L (ref 1–40)
BASOPHILS # BLD AUTO: 0.07 10*3/MM3 (ref 0–0.2)
BASOPHILS NFR BLD AUTO: 0.9 % (ref 0–1.5)
BILIRUB SERPL-MCNC: 0.5 MG/DL (ref 0–1.2)
BUN SERPL-MCNC: 10 MG/DL (ref 6–20)
BUN/CREAT SERPL: 9.9 (ref 7–25)
CALCIUM SPEC-SCNC: 9.7 MG/DL (ref 8.6–10.5)
CHLORIDE SERPL-SCNC: 104 MMOL/L (ref 98–107)
CHOLEST SERPL-MCNC: 148 MG/DL (ref 0–200)
CO2 SERPL-SCNC: 26 MMOL/L (ref 22–29)
CREAT SERPL-MCNC: 1.01 MG/DL (ref 0.76–1.27)
DEPRECATED RDW RBC AUTO: 41.2 FL (ref 37–54)
EGFRCR SERPLBLD CKD-EPI 2021: 87.3 ML/MIN/1.73
EOSINOPHIL # BLD AUTO: 0.28 10*3/MM3 (ref 0–0.4)
EOSINOPHIL NFR BLD AUTO: 3.4 % (ref 0.3–6.2)
ERYTHROCYTE [DISTWIDTH] IN BLOOD BY AUTOMATED COUNT: 12 % (ref 12.3–15.4)
GLOBULIN UR ELPH-MCNC: 2.9 GM/DL
GLUCOSE SERPL-MCNC: 103 MG/DL (ref 65–99)
HBA1C MFR BLD: 5.8 % (ref 4.8–5.6)
HCT VFR BLD AUTO: 41.1 % (ref 37.5–51)
HDLC SERPL-MCNC: 48 MG/DL (ref 40–60)
HGB BLD-MCNC: 14.2 G/DL (ref 13–17.7)
IMM GRANULOCYTES # BLD AUTO: 0.03 10*3/MM3 (ref 0–0.05)
IMM GRANULOCYTES NFR BLD AUTO: 0.4 % (ref 0–0.5)
LDLC SERPL CALC-MCNC: 76 MG/DL (ref 0–100)
LDLC/HDLC SERPL: 1.52 {RATIO}
LYMPHOCYTES # BLD AUTO: 1.96 10*3/MM3 (ref 0.7–3.1)
LYMPHOCYTES NFR BLD AUTO: 24.1 % (ref 19.6–45.3)
MCH RBC QN AUTO: 32.9 PG (ref 26.6–33)
MCHC RBC AUTO-ENTMCNC: 34.5 G/DL (ref 31.5–35.7)
MCV RBC AUTO: 95.1 FL (ref 79–97)
MONOCYTES # BLD AUTO: 0.84 10*3/MM3 (ref 0.1–0.9)
MONOCYTES NFR BLD AUTO: 10.3 % (ref 5–12)
NEUTROPHILS NFR BLD AUTO: 4.94 10*3/MM3 (ref 1.7–7)
NEUTROPHILS NFR BLD AUTO: 60.9 % (ref 42.7–76)
NRBC BLD AUTO-RTO: 0 /100 WBC (ref 0–0.2)
PLATELET # BLD AUTO: 169 10*3/MM3 (ref 140–450)
PMV BLD AUTO: 11.6 FL (ref 6–12)
POTASSIUM SERPL-SCNC: 4.5 MMOL/L (ref 3.5–5.2)
PROT SERPL-MCNC: 7.1 G/DL (ref 6–8.5)
PSA SERPL-MCNC: 0.55 NG/ML (ref 0–4)
RBC # BLD AUTO: 4.32 10*6/MM3 (ref 4.14–5.8)
SODIUM SERPL-SCNC: 141 MMOL/L (ref 136–145)
TRIGL SERPL-MCNC: 136 MG/DL (ref 0–150)
TSH SERPL DL<=0.05 MIU/L-ACNC: 3.46 UIU/ML (ref 0.27–4.2)
VLDLC SERPL-MCNC: 24 MG/DL (ref 5–40)
WBC NRBC COR # BLD AUTO: 8.12 10*3/MM3 (ref 3.4–10.8)

## 2023-12-21 PROCEDURE — 83036 HEMOGLOBIN GLYCOSYLATED A1C: CPT

## 2023-12-21 PROCEDURE — 85025 COMPLETE CBC W/AUTO DIFF WBC: CPT

## 2023-12-21 PROCEDURE — 80053 COMPREHEN METABOLIC PANEL: CPT

## 2023-12-21 PROCEDURE — 80061 LIPID PANEL: CPT

## 2023-12-21 PROCEDURE — G0103 PSA SCREENING: HCPCS

## 2023-12-21 PROCEDURE — 99396 PREV VISIT EST AGE 40-64: CPT | Performed by: FAMILY MEDICINE

## 2023-12-21 PROCEDURE — 84443 ASSAY THYROID STIM HORMONE: CPT

## 2023-12-21 NOTE — PROGRESS NOTES
Robin Whitlock is a 58 y.o. male who presents today to complete annual exam.    Chief Complaint   Patient presents with    Annual Exam        Patient is here for annual exam. He has not been exercising as much as he should but does walk when he can. He eats a generally healthy and varied diet but it varies a lot depending on his traveling for work. He sees the dentist twice a year. He sees optometrist once a year. He has not seen the dermatologist. He has no acute complaints today.          Review of Systems   Constitutional:  Negative for fever and unexpected weight loss.   HENT:  Negative for congestion, ear pain and sore throat.    Eyes:  Negative for visual disturbance.   Respiratory:  Negative for cough, shortness of breath and wheezing.    Cardiovascular:  Negative for chest pain and palpitations.   Gastrointestinal:  Negative for abdominal pain, blood in stool, constipation, diarrhea, nausea, vomiting and GERD.   Endocrine: Negative for polydipsia and polyuria.   Genitourinary:  Negative for difficulty urinating.   Musculoskeletal:  Positive for arthralgias (right hip pain improving). Negative for joint swelling.   Skin:  Negative for rash and skin lesions.   Allergic/Immunologic: Negative for environmental allergies.   Neurological:  Positive for numbness (tingling and numbness from right knee to toes. Chronic and improves with PT.). Negative for seizures and syncope.   Hematological:  Does not bruise/bleed easily.   Psychiatric/Behavioral:  Negative for suicidal ideas.         PHQ-9 Depression Screening  Little interest or pleasure in doing things? 0-->not at all   Feeling down, depressed, or hopeless? 0-->not at all   Trouble falling or staying asleep, or sleeping too much?     Feeling tired or having little energy?     Poor appetite or overeating?     Feeling bad about yourself - or that you are a failure or have let yourself or your family down?     Trouble concentrating on things, such as  reading the newspaper or watching television?     Moving or speaking so slowly that other people could have noticed? Or the opposite - being so fidgety or restless that you have been moving around a lot more than usual?     Thoughts that you would be better off dead, or of hurting yourself in some way?     PHQ-9 Total Score 0   If you checked off any problems, how difficult have these problems made it for you to do your work, take care of things at home, or get along with other people?         Past Medical History:   Diagnosis Date    BPH (benign prostatic hypertrophy)     Broken arm 2013    CAD (coronary artery disease)     Cystic acne     Depression     ED (erectile dysfunction) of non-organic origin     History of myocardial infarction     Hyperlipidemia     Hypertension 12/01/2010    Myocardial infarction 12/2010    Neuromuscular disorder 1/2023    Obesity 12/01/2010    [place] (obstructive sleep apnea)         Past Surgical History:   Procedure Laterality Date    CARDIAC CATHETERIZATION  2012    COLONOSCOPY  2017    CORONARY STENT PLACEMENT  2012        Family History   Problem Relation Age of Onset    Heart attack Father     Multiple sclerosis Father     Heart failure Father         mutiple heart attacks    Lung cancer Father     Cancer Father         multple cancers    Heart disease Father     Heart failure Mother         mutiple heart attacks    Heart attack Mother     Lung cancer Mother     Cancer Mother         lung cancer    Heart disease Mother     No Known Problems Sister         Social History     Socioeconomic History    Marital status:      Spouse name: N/A    Number of children: 0    Years of education: College    Highest education level: Bachelor's degree (e.g., BA, AB, BS)   Tobacco Use    Smoking status: Former     Packs/day: 0.50     Years: 27.00     Additional pack years: 0.00     Total pack years: 13.50     Types: Cigarettes     Start date: 3/29/2017     Quit date: 10/27/2017     Years  "since quittin.1    Smokeless tobacco: Never   Vaping Use    Vaping Use: Never used   Substance and Sexual Activity    Alcohol use: Yes     Alcohol/week: 46.0 standard drinks of alcohol     Types: 6 Cans of beer, 40 Shots of liquor per week     Comment: often    Drug use: No    Sexual activity: Yes     Partners: Female     Birth control/protection: Pill     Comment: wife only        Current Outpatient Medications on File Prior to Visit   Medication Sig Dispense Refill    amLODIPine (NORVASC) 2.5 MG tablet Take 1 tablet by mouth Daily. 90 tablet 3    aspirin 81 MG tablet Take 1 tablet by mouth Daily.      cholecalciferol (VITAMIN D3) 1000 UNITS tablet Take 2 tablets by mouth Daily.      clopidogrel (PLAVIX) 75 MG tablet Take 1 tablet by mouth Daily. 90 tablet 3    ezetimibe (ZETIA) 10 MG tablet Take 1 tablet by mouth Daily. 90 tablet 3    isosorbide mononitrate (IMDUR) 30 MG 24 hr tablet Take 1 tablet by mouth Daily. 90 tablet 3    metoprolol tartrate (LOPRESSOR) 25 MG tablet Take 1 tablet by mouth 2 (Two) Times a Day. 180 tablet 3    nitroglycerin (NITROSTAT) 0.4 MG SL tablet Place 1 tablet under the tongue Every 5 (Five) Minutes As Needed for Chest Pain. Take no more than 3 doses in 15 minutes. 25 tablet 2    rosuvastatin (CRESTOR) 40 MG tablet Take 1 tablet by mouth Daily. 90 tablet 3    tadalafil (CIALIS) 5 MG tablet Take 1 tablet by mouth Daily. 90 tablet 10     No current facility-administered medications on file prior to visit.       Allergies   Allergen Reactions    Wasp Venom Swelling     Swarmed as a child and told to avoid    Sulfa Antibiotics Rash        Visit Vitals  /74   Pulse 59   Temp 98.8 °F (37.1 °C) (Infrared)   Resp 18   Ht 185.4 cm (72.99\")   Wt 116 kg (256 lb 6.4 oz)   BMI 33.84 kg/m²      Body mass index is 33.84 kg/m².    Physical Exam  Constitutional:       General: He is not in acute distress.     Appearance: He is well-developed. He is not diaphoretic.   HENT:      Head: " Atraumatic.   Cardiovascular:      Rate and Rhythm: Normal rate and regular rhythm.      Heart sounds: Normal heart sounds. No murmur heard.     No friction rub. No gallop.   Pulmonary:      Effort: Pulmonary effort is normal. No respiratory distress.      Breath sounds: Normal breath sounds. No stridor. No wheezing, rhonchi or rales.   Abdominal:      General: Bowel sounds are normal. There is no distension.      Palpations: Abdomen is soft. There is no mass.      Tenderness: There is no abdominal tenderness. There is no guarding or rebound.      Hernia: No hernia is present.   Musculoskeletal:      Cervical back: Normal range of motion and neck supple.   Skin:     General: Skin is warm and dry.   Neurological:      Mental Status: He is alert and oriented to person, place, and time.   Psychiatric:         Behavior: Behavior normal.          Results for orders placed or performed in visit on 12/15/22   Cologuard - Stool, Per Rectum    Specimen: Per Rectum; Stool   Result Value Ref Range    Cologuard Negative Negative        Problems Addressed this Visit          Cardiac and Vasculature    Hyperlipidemia    Relevant Orders    Comprehensive Metabolic Panel    Lipid Panel    Essential hypertension    Relevant Orders    Comprehensive Metabolic Panel    CBC & Differential    Lipid Panel    Hemoglobin A1c    TSH Rfx On Abnormal To Free T4       Genitourinary and Reproductive     Screening for prostate cancer    Relevant Orders    PSA Screen       Health Encounters    Well adult exam - Primary     The patient is here for health maintenance visit.  Currently, the patient consumes a unhealthy diet and has an inadequate exercise regimen.  Screening lab work is ordered.  Immunizations were reviewed today.  Advice and education was given regarding nutrition, aerobic exercise, routine dental evaluations, routine eye exams, reproductive health, cardiovascular risk reduction, sunscreen use, self skin examination (annual  dermatology evaluations) and seatbelt use (general overall safety).  Further recommendations will be given if needed after lab evaluation.  Annual wellness evaluation is recommended.           Relevant Orders    Comprehensive Metabolic Panel    CBC & Differential    Lipid Panel    PSA Screen    Hemoglobin A1c    TSH Rfx On Abnormal To Free T4     Diagnoses         Codes Comments    Well adult exam    -  Primary ICD-10-CM: Z00.00  ICD-9-CM: V70.0     Screening for prostate cancer     ICD-10-CM: Z12.5  ICD-9-CM: V76.44     Mixed hyperlipidemia     ICD-10-CM: E78.2  ICD-9-CM: 272.2     Essential hypertension     ICD-10-CM: I10  ICD-9-CM: 401.9             Return in about 1 year (around 12/21/2024) for Annual.    Be Calderon MD  12/21/2023

## 2023-12-21 NOTE — PATIENT INSTRUCTIONS
"Hypertension, Adult  High blood pressure (hypertension) is when the force of blood pumping through the arteries is too strong. The arteries are the blood vessels that carry blood from the heart throughout the body. Hypertension forces the heart to work harder to pump blood and may cause arteries to become narrow or stiff. Untreated or uncontrolled hypertension can lead to a heart attack, heart failure, a stroke, kidney disease, and other problems.  A blood pressure reading consists of a higher number over a lower number. Ideally, your blood pressure should be below 120/80. The first (\"top\") number is called the systolic pressure. It is a measure of the pressure in your arteries as your heart beats. The second (\"bottom\") number is called the diastolic pressure. It is a measure of the pressure in your arteries as the heart relaxes.  What are the causes?  The exact cause of this condition is not known. There are some conditions that result in high blood pressure.  What increases the risk?  Certain factors may make you more likely to develop high blood pressure. Some of these risk factors are under your control, including:  Smoking.  Not getting enough exercise or physical activity.  Being overweight.  Having too much fat, sugar, calories, or salt (sodium) in your diet.  Drinking too much alcohol.  Other risk factors include:  Having a personal history of heart disease, diabetes, high cholesterol, or kidney disease.  Stress.  Having a family history of high blood pressure and high cholesterol.  Having obstructive sleep apnea.  Age. The risk increases with age.  What are the signs or symptoms?  High blood pressure may not cause symptoms. Very high blood pressure (hypertensive crisis) may cause:  Headache.  Fast or irregular heartbeats (palpitations).  Shortness of breath.  Nosebleed.  Nausea and vomiting.  Vision changes.  Severe chest pain, dizziness, and seizures.  How is this diagnosed?  This condition is diagnosed by " measuring your blood pressure while you are seated, with your arm resting on a flat surface, your legs uncrossed, and your feet flat on the floor. The cuff of the blood pressure monitor will be placed directly against the skin of your upper arm at the level of your heart. Blood pressure should be measured at least twice using the same arm. Certain conditions can cause a difference in blood pressure between your right and left arms.  If you have a high blood pressure reading during one visit or you have normal blood pressure with other risk factors, you may be asked to:  Return on a different day to have your blood pressure checked again.  Monitor your blood pressure at home for 1 week or longer.  If you are diagnosed with hypertension, you may have other blood or imaging tests to help your health care provider understand your overall risk for other conditions.  How is this treated?  This condition is treated by making healthy lifestyle changes, such as eating healthy foods, exercising more, and reducing your alcohol intake. You may be referred for counseling on a healthy diet and physical activity.  Your health care provider may prescribe medicine if lifestyle changes are not enough to get your blood pressure under control and if:  Your systolic blood pressure is above 130.  Your diastolic blood pressure is above 80.  Your personal target blood pressure may vary depending on your medical conditions, your age, and other factors.  Follow these instructions at home:  Eating and drinking    Eat a diet that is high in fiber and potassium, and low in sodium, added sugar, and fat. An example of this eating plan is called the DASH diet. DASH stands for Dietary Approaches to Stop Hypertension. To eat this way:  Eat plenty of fresh fruits and vegetables. Try to fill one half of your plate at each meal with fruits and vegetables.  Eat whole grains, such as whole-wheat pasta, brown rice, or whole-grain bread. Fill about one  fourth of your plate with whole grains.  Eat or drink low-fat dairy products, such as skim milk or low-fat yogurt.  Avoid fatty cuts of meat, processed or cured meats, and poultry with skin. Fill about one fourth of your plate with lean proteins, such as fish, chicken without skin, beans, eggs, or tofu.  Avoid pre-made and processed foods. These tend to be higher in sodium, added sugar, and fat.  Reduce your daily sodium intake. Many people with hypertension should eat less than 1,500 mg of sodium a day.  Do not drink alcohol if:  Your health care provider tells you not to drink.  You are pregnant, may be pregnant, or are planning to become pregnant.  If you drink alcohol:  Limit how much you have to:  0-1 drink a day for women.  0-2 drinks a day for men.  Know how much alcohol is in your drink. In the [place] one drink equals one 12 oz bottle of beer (355 mL), one 5 oz glass of wine (148 mL), or one 1½ oz glass of hard liquor (44 mL).  Lifestyle    Work with your health care provider to maintain a healthy body weight or to lose weight. Ask what an ideal weight is for you.  Get at least 30 minutes of exercise that causes your heart to beat faster (aerobic exercise) most days of the week. Activities may include walking, swimming, or biking.  Include exercise to strengthen your muscles (resistance exercise), such as Pilates or lifting weights, as part of your weekly exercise routine. Try to do these types of exercises for 30 minutes at least 3 days a week.  Do not use any products that contain nicotine or tobacco. These products include cigarettes, chewing tobacco, and vaping devices, such as e-cigarettes. If you need help quitting, ask your health care provider.  Monitor your blood pressure at home as told by your health care provider.  Keep all follow-up visits. This is important.  Medicines  Take over-the-counter and prescription medicines only as told by your health care provider. Follow directions carefully. Blood  pressure medicines must be taken as prescribed.  Do not skip doses of blood pressure medicine. Doing this puts you at risk for problems and can make the medicine less effective.  Ask your health care provider about side effects or reactions to medicines that you should watch for.  Contact a health care provider if you:  Think you are having a reaction to a medicine you are taking.  Have headaches that keep coming back (recurring).  Feel dizzy.  Have swelling in your ankles.  Have trouble with your vision.  Get help right away if you:  Develop a severe headache or confusion.  Have unusual weakness or numbness.  Feel faint.  Have severe pain in your chest or abdomen.  Vomit repeatedly.  Have trouble breathing.  These symptoms may be an emergency. Get help right away. Call 911.  Do not wait to see if the symptoms will go away.  Do not drive yourself to the hospital.  Summary  Hypertension is when the force of blood pumping through your arteries is too strong. If this condition is not controlled, it may put you at risk for serious complications.  Your personal target blood pressure may vary depending on your medical conditions, your age, and other factors. For most people, a normal blood pressure is less than 120/80.  Hypertension is treated with lifestyle changes, medicines, or a combination of both. Lifestyle changes include losing weight, eating a healthy, low-sodium diet, exercising more, and limiting alcohol.  This information is not intended to replace advice given to you by your health care provider. Make sure you discuss any questions you have with your health care provider.  Document Revised: 10/25/2022 Document Reviewed: 10/25/2022  [employer] Patient Education © 2023 [employer]  BMI for Adults  What is BMI?  Body mass index (BMI) is a number that is calculated from a person's weight and height. BMI can help estimate how much of a person's weight is composed of fat. BMI does not measure body fat directly.  "Rather, it is an alternative to procedures that directly measure body fat, which can be difficult and expensive.  BMI can help identify people who may be at higher risk for certain medical problems.  What are BMI measurements used for?  BMI is used as a screening tool to identify possible weight problems. It helps determine whether a person is obese, overweight, a healthy weight, or underweight.  BMI is useful for:  Identifying a weight problem that may be related to a medical condition or may increase the risk for medical problems.  Promoting changes, such as changes in diet and exercise, to help reach a healthy weight. BMI screening can be repeated to see if these changes are working.  How is BMI calculated?  BMI involves measuring your weight in relation to your height. Both height and weight are measured, and the BMI is calculated from those numbers. This can be done either in English ([place] or metric measurements. Note that charts and online BMI calculators are available to help you find your BMI quickly and easily without having to do these calculations yourself.  To calculate your BMI in English ([place] measurements:    Measure your weight in pounds (lb).  Multiply the number of pounds by 703.  For example, for a person who weighs 180 lb, multiply that number by 703, which equals 126,540.  Measure your height in inches. Then multiply that number by itself to get a measurement called \"inches squared.\"  For example, for a person who is 70 inches tall, the \"inches squared\" measurement is 70 inches x 70 inches, which equals 4,900 inches squared.  Divide the total from step 2 (number of lb x 703) by the total from step 3 (inches squared): 126,540 ÷ 4,900 = 25.8. This is your BMI.  To calculate your BMI in metric measurements:  Measure your weight in kilograms (kg).  Measure your height in meters (m). Then multiply that number by itself to get a measurement called \"meters squared.\"  For example, for a person who is " "1.75 m tall, the \"meters squared\" measurement is 1.75 m x 1.75 m, which is equal to 3.1 meters squared.  Divide the number of kilograms (your weight) by the meters squared number. In this example: 70 ÷ 3.1 = 22.6. This is your BMI.  What do the results mean?  BMI charts are used to identify whether you are underweight, normal weight, overweight, or obese. The following guidelines will be used:  Underweight: BMI less than 18.5.  Normal weight: BMI between 18.5 and 24.9.  Overweight: BMI between 25 and 29.9.  Obese: BMI of 30 or above.  Keep these notes in mind:  Weight includes both fat and muscle, so someone with a muscular build, such as an athlete, may have a BMI that is higher than 24.9. In cases like these, BMI is not an accurate measure of body fat.  To determine if excess body fat is the cause of a BMI of 25 or higher, further assessments may need to be done by a health care provider.  BMI is usually interpreted in the same way for men and women.  Where to find more information  For more information about BMI, including tools to quickly calculate your BMI, go to these websites:  Centers for Disease Control and Prevention: www.cdc.gov  American Heart Association: www.heart.org  National Heart, Lung, and Blood [place]: www.nhlbi.nih.gov  Summary  Body mass index (BMI) is a number that is calculated from a person's weight and height.  BMI may help estimate how much of a person's weight is composed of fat. BMI can help identify those who may be at higher risk for certain medical problems.  BMI can be measured using English measurements or metric measurements.  BMI charts are used to identify whether you are underweight, normal weight, overweight, or obese.  This information is not intended to replace advice given to you by your health care provider. Make sure you discuss any questions you have with your health care provider.  Document Revised: 05/31/2023 Document Reviewed: 07/17/2020  [employer] Patient Education " © 2023 [employer] Inc.

## 2024-03-26 ENCOUNTER — LAB (OUTPATIENT)
Dept: LAB | Facility: HOSPITAL | Age: 59
End: 2024-03-26
Payer: COMMERCIAL

## 2024-03-26 DIAGNOSIS — R73.03 PREDIABETES: ICD-10-CM

## 2024-03-26 DIAGNOSIS — R79.89 ELEVATED LFTS: ICD-10-CM

## 2024-03-26 DIAGNOSIS — R79.89 ELEVATED LFTS: Primary | ICD-10-CM

## 2024-03-26 LAB
ALBUMIN SERPL-MCNC: 4.4 G/DL (ref 3.5–5.2)
ALBUMIN/GLOB SERPL: 1.5 G/DL
ALP SERPL-CCNC: 75 U/L (ref 39–117)
ALT SERPL W P-5'-P-CCNC: 51 U/L (ref 1–41)
ANION GAP SERPL CALCULATED.3IONS-SCNC: 13.3 MMOL/L (ref 5–15)
AST SERPL-CCNC: 58 U/L (ref 1–40)
BILIRUB SERPL-MCNC: 0.6 MG/DL (ref 0–1.2)
BUN SERPL-MCNC: 9 MG/DL (ref 6–20)
BUN/CREAT SERPL: 9.8 (ref 7–25)
CALCIUM SPEC-SCNC: 10.4 MG/DL (ref 8.6–10.5)
CHLORIDE SERPL-SCNC: 103 MMOL/L (ref 98–107)
CO2 SERPL-SCNC: 25.7 MMOL/L (ref 22–29)
CREAT SERPL-MCNC: 0.92 MG/DL (ref 0.76–1.27)
EGFRCR SERPLBLD CKD-EPI 2021: 97 ML/MIN/1.73
GLOBULIN UR ELPH-MCNC: 3 GM/DL
GLUCOSE SERPL-MCNC: 109 MG/DL (ref 65–99)
HBA1C MFR BLD: 5.6 % (ref 4.8–5.6)
POTASSIUM SERPL-SCNC: 4.9 MMOL/L (ref 3.5–5.2)
PROT SERPL-MCNC: 7.4 G/DL (ref 6–8.5)
SODIUM SERPL-SCNC: 142 MMOL/L (ref 136–145)

## 2024-03-26 PROCEDURE — 80053 COMPREHEN METABOLIC PANEL: CPT

## 2024-03-26 PROCEDURE — 83036 HEMOGLOBIN GLYCOSYLATED A1C: CPT

## 2024-03-28 ENCOUNTER — OFFICE VISIT (OUTPATIENT)
Dept: FAMILY MEDICINE CLINIC | Facility: CLINIC | Age: 59
End: 2024-03-28
Payer: COMMERCIAL

## 2024-03-28 VITALS
HEIGHT: 73 IN | BODY MASS INDEX: 32.84 KG/M2 | DIASTOLIC BLOOD PRESSURE: 76 MMHG | SYSTOLIC BLOOD PRESSURE: 120 MMHG | TEMPERATURE: 98.4 F | HEART RATE: 83 BPM | OXYGEN SATURATION: 98 % | WEIGHT: 247.8 LBS

## 2024-03-28 DIAGNOSIS — R79.89 ELEVATED LFTS: Primary | ICD-10-CM

## 2024-03-28 PROCEDURE — 99213 OFFICE O/P EST LOW 20 MIN: CPT | Performed by: FAMILY MEDICINE

## 2024-03-28 NOTE — PROGRESS NOTES
Robin Whitlock is a 59 y.o. male who presents today for elevated lft and Prediabetes      Patient is here to follow-up on labs. His A1c has returned to normal. His LFTs have continued to elevate. He has been home for two weeks and eating healthier so he has lost some weight. He has been walking for exercise. 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 He drinks 1.75 liter of serafin bianchi a week and about 6 beers a week. He does have some occasional acid reflux controlled with prn tums. He denies bowel changes, abdominal pain, abdominal bloating, urinary symptoms, or yellowing or skin/eyes.          Review of Systems   Constitutional:  Negative for fever and unexpected weight loss.   HENT:  Negative for congestion, ear pain and sore throat.    Eyes:  Negative for visual disturbance.   Respiratory:  Negative for cough, shortness of breath and wheezing.    Cardiovascular:  Negative for chest pain and palpitations.   Gastrointestinal:  Negative for abdominal pain, blood in stool, constipation, diarrhea, nausea, vomiting and GERD.   Endocrine: Negative for polydipsia and polyuria.   Genitourinary:  Negative for difficulty urinating.   Musculoskeletal:  Negative for joint swelling.   Skin:  Negative for rash and skin lesions.   Allergic/Immunologic: Negative for environmental allergies.   Neurological:  Negative for seizures and syncope.   Hematological:  Does not bruise/bleed easily.   Psychiatric/Behavioral:  Negative for suicidal ideas.         The following portions of the patient's history were reviewed and updated as appropriate: allergies, current medications, past family history, past medical history, past social history, past surgical history and problem list.    Current Outpatient Medications on File Prior to Visit   Medication Sig Dispense Refill    amLODIPine (NORVASC) 2.5 MG tablet Take 1 tablet by mouth Daily. 90 tablet 3    aspirin 81 MG tablet Take 1 tablet by mouth  "Daily.      cholecalciferol (VITAMIN D3) 1000 UNITS tablet Take 2 tablets by mouth Daily.      clopidogrel (PLAVIX) 75 MG tablet Take 1 tablet by mouth Daily. 90 tablet 3    ezetimibe (ZETIA) 10 MG tablet Take 1 tablet by mouth Daily. 90 tablet 3    isosorbide mononitrate (IMDUR) 30 MG 24 hr tablet Take 1 tablet by mouth Daily. 90 tablet 3    metoprolol tartrate (LOPRESSOR) 25 MG tablet Take 1 tablet by mouth 2 (Two) Times a Day. 180 tablet 3    nitroglycerin (NITROSTAT) 0.4 MG SL tablet Place 1 tablet under the tongue Every 5 (Five) Minutes As Needed for Chest Pain. Take no more than 3 doses in 15 minutes. 25 tablet 2    rosuvastatin (CRESTOR) 40 MG tablet Take 1 tablet by mouth Daily. 90 tablet 3    tadalafil (CIALIS) 5 MG tablet Take 1 tablet by mouth Daily. 90 tablet 10     No current facility-administered medications on file prior to visit.       Allergies   Allergen Reactions    Wasp Venom Swelling     Swarmed as a child and told to avoid    Sulfa Antibiotics Rash        Visit Vitals  /76 (BP Location: Left arm, Patient Position: Sitting, Cuff Size: Adult)   Pulse 83   Temp 98.4 °F (36.9 °C) (Temporal)   Ht 185.4 cm (72.99\")   Wt 112 kg (247 lb 12.8 oz)   SpO2 98%   BMI 32.70 kg/m²        Physical Exam  Constitutional:       General: He is not in acute distress.     Appearance: He is well-developed. He is not diaphoretic.   HENT:      Head: Atraumatic.   Cardiovascular:      Rate and Rhythm: Normal rate and regular rhythm.      Heart sounds: Normal heart sounds. No murmur heard.     No friction rub. No gallop.   Pulmonary:      Effort: Pulmonary effort is normal. No respiratory distress.      Breath sounds: Normal breath sounds. No stridor. No wheezing, rhonchi or rales.   Abdominal:      General: Bowel sounds are normal. There is no distension.      Palpations: Abdomen is soft. There is no mass.      Tenderness: There is no abdominal tenderness. There is no guarding or rebound.      Hernia: No hernia is " present.   Musculoskeletal:      Cervical back: Normal range of motion and neck supple.   Skin:     General: Skin is warm and dry.   Neurological:      Mental Status: He is alert and oriented to person, place, and time.   Psychiatric:         Behavior: Behavior normal.          Results for orders placed or performed in visit on 03/26/24   Comprehensive Metabolic Panel    Specimen: Blood   Result Value Ref Range    Glucose 109 (H) 65 - 99 mg/dL    BUN 9 6 - 20 mg/dL    Creatinine 0.92 0.76 - 1.27 mg/dL    Sodium 142 136 - 145 mmol/L    Potassium 4.9 3.5 - 5.2 mmol/L    Chloride 103 98 - 107 mmol/L    CO2 25.7 22.0 - 29.0 mmol/L    Calcium 10.4 8.6 - 10.5 mg/dL    Total Protein 7.4 6.0 - 8.5 g/dL    Albumin 4.4 3.5 - 5.2 g/dL    ALT (SGPT) 51 (H) 1 - 41 U/L    AST (SGOT) 58 (H) 1 - 40 U/L    Alkaline Phosphatase 75 39 - 117 U/L    Total Bilirubin 0.6 0.0 - 1.2 mg/dL    Globulin 3.0 gm/dL    A/G Ratio 1.5 g/dL    BUN/Creatinine Ratio 9.8 7.0 - 25.0    Anion Gap 13.3 5.0 - 15.0 mmol/L    eGFR 97.0 >60.0 mL/min/1.73   Hemoglobin A1c    Specimen: Blood   Result Value Ref Range    Hemoglobin A1C 5.60 4.80 - 5.60 %        Problems Addressed this Visit          Gastrointestinal Abdominal     Elevated LFTs - Primary     LFTs have continued to elevate.  I suspect this is secondary to fatty liver disease due to heavy alcohol use.  Patient was advised to cut back on alcohol with a goal of getting to 2 drinks per day or less.  Order hepatitis panel as well as ultrasound of liver today for further evaluation.  Patient was given referral to gastroenterology for further evaluation and treatment.  RTC/ED precautions given.         Relevant Orders    Hepatitis panel, acute    US Liver    Ambulatory Referral to Gastroenterology     Diagnoses         Codes Comments    Elevated LFTs    -  Primary ICD-10-CM: R79.89  ICD-9-CM: 790.6             Return in about 4 months (around 7/28/2024) for Follow-up HTN, HLD, elevated lft.    Be LORD  MD Kvng   3/29/2024

## 2024-03-29 PROBLEM — R79.89 ELEVATED LFTS: Status: ACTIVE | Noted: 2024-03-29

## 2024-03-29 NOTE — ASSESSMENT & PLAN NOTE
LFTs have continued to elevate.  I suspect this is secondary to fatty liver disease due to heavy alcohol use.  Patient was advised to cut back on alcohol with a goal of getting to 2 drinks per day or less.  Order hepatitis panel as well as ultrasound of liver today for further evaluation.  Patient was given referral to gastroenterology for further evaluation and treatment.  RTC/ED precautions given.

## 2024-06-06 DIAGNOSIS — N40.1 BENIGN NON-NODULAR PROSTATIC HYPERPLASIA WITH LOWER URINARY TRACT SYMPTOMS: ICD-10-CM

## 2024-06-07 RX ORDER — TADALAFIL 5 MG/1
5 TABLET ORAL DAILY
Qty: 90 TABLET | Refills: 10 | Status: SHIPPED | OUTPATIENT
Start: 2024-06-07

## 2024-06-11 ENCOUNTER — OFFICE VISIT (OUTPATIENT)
Dept: GASTROENTEROLOGY | Facility: CLINIC | Age: 59
End: 2024-06-11
Payer: COMMERCIAL

## 2024-06-11 ENCOUNTER — HOSPITAL ENCOUNTER (OUTPATIENT)
Dept: ULTRASOUND IMAGING | Facility: HOSPITAL | Age: 59
Discharge: HOME | End: 2024-06-11
Admitting: FAMILY MEDICINE
Payer: COMMERCIAL

## 2024-06-11 VITALS
HEIGHT: 73 IN | HEART RATE: 63 BPM | SYSTOLIC BLOOD PRESSURE: 118 MMHG | OXYGEN SATURATION: 96 % | TEMPERATURE: 97.8 F | BODY MASS INDEX: 33.4 KG/M2 | WEIGHT: 252 LBS | DIASTOLIC BLOOD PRESSURE: 76 MMHG

## 2024-06-11 DIAGNOSIS — R79.89 ELEVATED LFTS: ICD-10-CM

## 2024-06-11 DIAGNOSIS — R74.8 ABNORMAL LIVER ENZYMES: Primary | ICD-10-CM

## 2024-06-11 PROCEDURE — 76705 ECHO EXAM OF ABDOMEN: CPT

## 2024-06-11 NOTE — PROGRESS NOTES
PCP: Be Calderon MD    Chief Complaint   Patient presents with    Elevated Hepatic Enzymes        History of Present Illness:   Robin Whitlock is a 59 y.o. male who presents to the GI clinic as a consultation for abnormal liver enzymes. He has a h/o cad s/p 4 swtents, htn, hld, and central obesity. Last A1c 5.8. denies gib loss, fever, ascites, jaundice. Feels well overall. Just  from wife.      Past Medical History:   Diagnosis Date    BPH (benign prostatic hypertrophy)     Broken arm 2013    CAD (coronary artery disease)     Cystic acne     Depression     ED (erectile dysfunction) of non-organic origin     History of myocardial infarction     Hyperlipidemia     Hypertension 12/01/2010    Myocardial infarction 12/2010    Neuromuscular disorder 1/2023    Obesity 12/01/2010    [place] (obstructive sleep apnea)        Past Surgical History:   Procedure Laterality Date    CARDIAC CATHETERIZATION  2012    COLONOSCOPY  2017    CORONARY STENT PLACEMENT  2012         Current Outpatient Medications:     amLODIPine (NORVASC) 2.5 MG tablet, Take 1 tablet by mouth Daily., Disp: 90 tablet, Rfl: 3    aspirin 81 MG tablet, Take 1 tablet by mouth Daily., Disp: , Rfl:     cholecalciferol (VITAMIN D3) 1000 UNITS tablet, Take 2 tablets by mouth Daily., Disp: , Rfl:     clopidogrel (PLAVIX) 75 MG tablet, Take 1 tablet by mouth Daily., Disp: 90 tablet, Rfl: 3    ezetimibe (ZETIA) 10 MG tablet, Take 1 tablet by mouth Daily., Disp: 90 tablet, Rfl: 3    isosorbide mononitrate (IMDUR) 30 MG 24 hr tablet, Take 1 tablet by mouth Daily., Disp: 90 tablet, Rfl: 3    metoprolol tartrate (LOPRESSOR) 25 MG tablet, Take 1 tablet by mouth 2 (Two) Times a Day., Disp: 180 tablet, Rfl: 3    nitroglycerin (NITROSTAT) 0.4 MG SL tablet, Place 1 tablet under the tongue Every 5 (Five) Minutes As Needed for Chest Pain. Take no more than 3 doses in 15 minutes., Disp: 25 tablet, Rfl: 2    rosuvastatin (CRESTOR) 40 MG tablet, Take 1 tablet  by mouth Daily., Disp: 90 tablet, Rfl: 3    tadalafil (CIALIS) 5 MG tablet, TAKE ONE TABLET BY MOUTH DAILY, Disp: 90 tablet, Rfl: 10    Allergies   Allergen Reactions    Wasp Venom Swelling     Swarmed as a child and told to avoid    Sulfa Antibiotics Rash       Family History   Problem Relation Age of Onset    Heart attack Father     Multiple sclerosis Father     Heart failure Father         mutiple heart attacks    Lung cancer Father     Cancer Father         multple cancers    Heart disease Father     Heart failure Mother         mutiple heart attacks    Heart attack Mother     Lung cancer Mother     Cancer Mother         lung cancer    Heart disease Mother     No Known Problems Sister        Social History     Socioeconomic History    Marital status:      Spouse name: N/A    Number of children: 0    Years of education: College    Highest education level: Bachelor's degree (e.g., BA, AB, BS)   Tobacco Use    Smoking status: Former     Current packs/day: 0.00     Average packs/day: 0.5 packs/day for 27.0 years (13.5 ttl pk-yrs)     Types: Cigarettes     Start date: 3/29/2017     Quit date: 10/27/2017     Years since quittin.6    Smokeless tobacco: Never   Vaping Use    Vaping status: Never Used   Substance and Sexual Activity    Alcohol use: Yes     Alcohol/week: 46.0 standard drinks of alcohol     Types: 6 Cans of beer, 40 Shots of liquor per week     Comment: often    Drug use: No    Sexual activity: Yes     Partners: Female     Birth control/protection: Pill     Comment: wife only       Review of Systems  All other systems reviewed and are negative.    Vitals:    24 1409   BP: 118/76   Pulse: 63   Temp: 97.8 °F (36.6 °C)   SpO2: 96%       Physical Exam  General Appearance:  Vitals as above. no acute distress  Head/face:  Normocephalic, atraumatic  Eyes:   EOMI, no conjunctivitis or icterus   Nose/Sinuses:  Nares patent bilaterally without discharge or lesions  Mouth/Throat:  Normal oral  movements without dyskinesia  Neck:  trachea is midline, no thyromegaly  Lungs:  Normal work of breathing effort, no overt rales  Heart:  No overt JVD or type VI murmur  Abdomen:  Nondistended, no guarding or rebound tenderness  Neurologic:  Alert; no focal deficits; age appropriate behavior and speech  Psychiatric: mood and affect are congruent  Vascular: extremities without edema  Skin: no rash or cyanosis.      Assessment/Plan  1.) Abnormal liver enzymes  2.) Alcohol use  Suspect combination of alcohol and nonalcoholic fatty liver  Plan:  Avoid known toxins such as alcohol, nsaids  - serologic liver injury profile including HH genetics  - diet, exericse    3.) High BMI  4.) Metabolic syndrome  Patient is benefitting from statin use so would continue. Advised to pursue 10% wt loss to optimize any component of non alcoholic fatty liver disease    Goal to lose 2 lbs a month and to avoid alcohol to healthy bmi        Gadiel Nash MD  6/11/2024

## 2024-06-17 ENCOUNTER — LAB (OUTPATIENT)
Dept: LAB | Facility: HOSPITAL | Age: 59
End: 2024-06-17
Payer: COMMERCIAL

## 2024-06-17 DIAGNOSIS — R79.89 ELEVATED LFTS: ICD-10-CM

## 2024-06-17 DIAGNOSIS — R74.8 ABNORMAL LIVER ENZYMES: ICD-10-CM

## 2024-06-17 LAB — HBV SURFACE AB SER RIA-ACNC: NORMAL

## 2024-06-17 PROCEDURE — 80074 ACUTE HEPATITIS PANEL: CPT

## 2024-06-17 PROCEDURE — 86381 MITOCHONDRIAL ANTIBODY EACH: CPT

## 2024-06-17 PROCEDURE — 86708 HEPATITIS A ANTIBODY: CPT

## 2024-06-17 PROCEDURE — 86704 HEP B CORE ANTIBODY TOTAL: CPT

## 2024-06-17 PROCEDURE — 86015 ACTIN ANTIBODY EACH: CPT

## 2024-06-17 PROCEDURE — 36415 COLL VENOUS BLD VENIPUNCTURE: CPT

## 2024-06-17 PROCEDURE — 82103 ALPHA-1-ANTITRYPSIN TOTAL: CPT

## 2024-06-17 PROCEDURE — 81256 HFE GENE: CPT

## 2024-06-17 PROCEDURE — 82784 ASSAY IGA/IGD/IGG/IGM EACH: CPT

## 2024-06-17 PROCEDURE — 86038 ANTINUCLEAR ANTIBODIES: CPT

## 2024-06-17 PROCEDURE — 82104 ALPHA-1-ANTITRYPSIN PHENO: CPT

## 2024-06-17 PROCEDURE — 86706 HEP B SURFACE ANTIBODY: CPT

## 2024-06-18 LAB
HAV IGM SERPL QL IA: NORMAL
HBV CORE IGM SERPL QL IA: NORMAL
HBV SURFACE AG SERPL QL IA: NORMAL
HCV AB SER QL: NORMAL
IGA1 MFR SER: 284 MG/DL (ref 70–400)
IGG1 SER-MCNC: 1192 MG/DL (ref 700–1600)
IGM SERPL-MCNC: 45 MG/DL (ref 40–230)

## 2024-06-19 LAB
ANA SER QL: NEGATIVE
HAV AB SER QL IA: POSITIVE
HBV CORE AB SERPL QL IA: NEGATIVE
MITOCHONDRIA M2 IGG SER-ACNC: <20 UNITS (ref 0–20)
SMA IGG SER-ACNC: 30 UNITS (ref 0–19)

## 2024-06-24 LAB
A1AT PHENOTYP SERPL IFE: NORMAL
A1AT SERPL-MCNC: 147 MG/DL (ref 101–187)

## 2024-07-08 LAB
HFE GENE MUT ANL BLD/T: NORMAL
IMP & REVIEW OF LAB RESULTS: NORMAL

## 2024-08-23 DIAGNOSIS — I25.83 CORONARY ARTERY DISEASE DUE TO LIPID RICH PLAQUE: ICD-10-CM

## 2024-08-23 DIAGNOSIS — I25.10 CORONARY ARTERY DISEASE DUE TO LIPID RICH PLAQUE: ICD-10-CM

## 2024-08-23 RX ORDER — EZETIMIBE 10 MG/1
10 TABLET ORAL DAILY
Qty: 90 TABLET | Refills: 3 | Status: SHIPPED | OUTPATIENT
Start: 2024-08-23

## 2024-08-23 RX ORDER — AMLODIPINE BESYLATE 2.5 MG/1
2.5 TABLET ORAL DAILY
Qty: 90 TABLET | Refills: 3 | Status: SHIPPED | OUTPATIENT
Start: 2024-08-23

## 2024-08-23 RX ORDER — CLOPIDOGREL BISULFATE 75 MG/1
75 TABLET ORAL DAILY
Qty: 90 TABLET | Refills: 3 | Status: SHIPPED | OUTPATIENT
Start: 2024-08-23

## 2024-08-23 RX ORDER — ROSUVASTATIN CALCIUM 40 MG/1
40 TABLET, COATED ORAL DAILY
Qty: 90 TABLET | Refills: 3 | Status: SHIPPED | OUTPATIENT
Start: 2024-08-23

## 2024-08-23 RX ORDER — ISOSORBIDE MONONITRATE 30 MG/1
30 TABLET, EXTENDED RELEASE ORAL DAILY
Qty: 90 TABLET | Refills: 3 | Status: SHIPPED | OUTPATIENT
Start: 2024-08-23

## 2024-09-04 ENCOUNTER — OFFICE VISIT (OUTPATIENT)
Dept: FAMILY MEDICINE CLINIC | Facility: CLINIC | Age: 59
End: 2024-09-04
Payer: COMMERCIAL

## 2024-09-04 ENCOUNTER — PATIENT ROUNDING (BHMG ONLY) (OUTPATIENT)
Dept: GASTROENTEROLOGY | Facility: CLINIC | Age: 59
End: 2024-09-04
Payer: COMMERCIAL

## 2024-09-04 VITALS
WEIGHT: 246.8 LBS | HEIGHT: 73 IN | OXYGEN SATURATION: 98 % | DIASTOLIC BLOOD PRESSURE: 70 MMHG | TEMPERATURE: 98 F | BODY MASS INDEX: 32.71 KG/M2 | SYSTOLIC BLOOD PRESSURE: 126 MMHG | HEART RATE: 61 BPM

## 2024-09-04 DIAGNOSIS — I10 ESSENTIAL HYPERTENSION: Primary | ICD-10-CM

## 2024-09-04 DIAGNOSIS — E78.2 MIXED HYPERLIPIDEMIA: ICD-10-CM

## 2024-09-04 DIAGNOSIS — R79.89 ELEVATED LFTS: ICD-10-CM

## 2024-09-04 PROCEDURE — 99214 OFFICE O/P EST MOD 30 MIN: CPT | Performed by: FAMILY MEDICINE

## 2024-09-04 NOTE — PROGRESS NOTES
September 4, 2024    Hello, may I speak with Robin Whitlock?    My name is CHRISTINA      I am  with [facility]  [address]  [place], located [facility] [address] [phone].    Before we get started may I verify your date of birth? 1965    I am calling to officially welcome you to our practice and ask about your recent visit. Is this a good time to talk? yes    Tell me about your visit with us. What things went well?  Patient said everything went great       We're always looking for ways to make our patients' experiences even better. Do you have recommendations on ways we may improve?  no    Overall were you satisfied with your first visit to our practice? yes       I appreciate you taking the time to speak with me today. Is there anything else I can do for you? no      Thank you, and have a great day.

## 2024-09-04 NOTE — PROGRESS NOTES
Robin Whitlock is a 59 y.o. male who presents today for Hyperlipidemia and Hypertension      Patient has been working on losing weight to help with fatty liver disease. He has seen GI and they recommended weight loss. He has been walking 7-10 miles a day. He has not made diet changes. He is drinking about the same amount of alcohol. He has been able to lose some weight. He has been taking blood pressure medications as directed and has tolerated this well. He has been taking cholesterol medications and has tolerated these well.            Review of Systems   Constitutional:  Negative for fever and unexpected weight loss.   HENT:  Negative for congestion, ear pain and sore throat.    Eyes:  Negative for visual disturbance.   Respiratory:  Negative for cough, shortness of breath and wheezing.    Cardiovascular:  Negative for chest pain and palpitations.   Gastrointestinal:  Negative for abdominal pain, blood in stool, constipation, diarrhea, nausea, vomiting and GERD.   Endocrine: Negative for polydipsia and polyuria.   Genitourinary:  Negative for difficulty urinating.   Musculoskeletal:  Negative for joint swelling.   Skin:  Negative for rash and skin lesions.   Allergic/Immunologic: Negative for environmental allergies.   Neurological:  Negative for seizures and syncope.   Hematological:  Does not bruise/bleed easily.   Psychiatric/Behavioral:  Negative for suicidal ideas.         The following portions of the patient's history were reviewed and updated as appropriate: allergies, current medications, past family history, past medical history, past social history, past surgical history and problem list.    Current Outpatient Medications on File Prior to Visit   Medication Sig Dispense Refill    amLODIPine (NORVASC) 2.5 MG tablet TAKE 1 TABLET DAILY 90 tablet 3    aspirin 81 MG tablet Take 1 tablet by mouth Daily.      cholecalciferol (VITAMIN D3) 1000 UNITS tablet Take 2 tablets by mouth Daily.       "clopidogrel (PLAVIX) 75 MG tablet TAKE 1 TABLET DAILY 90 tablet 3    ezetimibe (ZETIA) 10 MG tablet TAKE 1 TABLET DAILY 90 tablet 3    isosorbide mononitrate (IMDUR) 30 MG 24 hr tablet TAKE 1 TABLET DAILY 90 tablet 3    metoprolol tartrate (LOPRESSOR) 25 MG tablet TAKE 1 TABLET TWICE A  tablet 3    nitroglycerin (NITROSTAT) 0.4 MG SL tablet Place 1 tablet under the tongue Every 5 (Five) Minutes As Needed for Chest Pain. Take no more than 3 doses in 15 minutes. 25 tablet 2    rosuvastatin (CRESTOR) 40 MG tablet TAKE 1 TABLET DAILY 90 tablet 3    tadalafil (CIALIS) 5 MG tablet TAKE ONE TABLET BY MOUTH DAILY 90 tablet 10     No current facility-administered medications on file prior to visit.       Allergies   Allergen Reactions    Wasp Venom Swelling     Swarmed as a child and told to avoid    Sulfa Antibiotics Rash        Visit Vitals  /70   Pulse 61   Temp 98 °F (36.7 °C) (Infrared)   Ht 185.4 cm (72.99\")   Wt 112 kg (246 lb 12.8 oz)   SpO2 98%   BMI 32.57 kg/m²        Physical Exam  Constitutional:       General: He is not in acute distress.     Appearance: He is well-developed. He is not diaphoretic.   HENT:      Head: Atraumatic.   Cardiovascular:      Rate and Rhythm: Normal rate and regular rhythm.      Heart sounds: Normal heart sounds. No murmur heard.     No friction rub. No gallop.   Pulmonary:      Effort: Pulmonary effort is normal. No respiratory distress.      Breath sounds: Normal breath sounds. No stridor. No wheezing, rhonchi or rales.   Musculoskeletal:      Cervical back: Normal range of motion and neck supple.   Skin:     General: Skin is warm and dry.   Neurological:      Mental Status: He is alert and oriented to person, place, and time.   Psychiatric:         Behavior: Behavior normal.          Results for orders placed or performed in visit on 06/17/24   Hepatitis panel, acute    Specimen: Blood   Result Value Ref Range    Hepatitis B Surface Ag Non-Reactive Non-Reactive    Hep A " IgM Non-Reactive Non-Reactive    Hep B C IgM Non-Reactive Non-Reactive    Hepatitis C Ab Non-Reactive Non-Reactive   STEVE    Specimen: Blood   Result Value Ref Range    STEVE Direct Negative Negative   Anti-Smooth Muscle Antibody Titer    Specimen: Blood   Result Value Ref Range    Smooth Muscle Ab 30 (H) 0 - 19 Units   Hemochromatosis Mutation    Specimen: Blood   Result Value Ref Range    Hemochromatosis Gene Comment     REVIEWED BY Comment    Hepatitis A Antibody, Total    Specimen: Blood   Result Value Ref Range    Hep A Total Ab Positive (A) Negative   Hepatitis B Core Antibody, Total    Specimen: Blood   Result Value Ref Range    Hep B Core Total Ab Negative Negative   Hepatitis B Surface Antibody    Specimen: Blood   Result Value Ref Range    Hep B S Ab Non-Reactive    IgG, IgA, IgM    Specimen: Blood   Result Value Ref Range    IgG 1,192 700 - 1,600 mg/dL    IgM 45 40 - 230 mg/dL    IgA 284 70 - 400 mg/dL   Mitochondrial Antibodies, M2    Specimen: Blood   Result Value Ref Range    Mitochondrial Ab <20.0 0.0 - 20.0 Units   Alpha - 1 - Antitrypsin Phenotype    Specimen: Blood   Result Value Ref Range    A-1 Antitrypsin 147 101 - 187 mg/dL    Phenotype MM         Problems Addressed this Visit          Cardiac and Vasculature    Hyperlipidemia      Lipid abnormalities are stable    Plan:  Continue same medication/s without change.      Discussed medication dosage, use, side effects, and goals of treatment in detail.    Counseled patient on lifestyle modifications to help control hyperlipidemia.     Patient Treatment Goals:   LDL goal is less than 70    Followup in 6 months.         Relevant Orders    Comprehensive Metabolic Panel    Lipid Panel    Essential hypertension - Primary     Hypertension is stable and controlled  Continue current treatment regimen.  Blood pressure will be reassessed in 6 months.         Relevant Orders    Comprehensive Metabolic Panel    Lipid Panel       Gastrointestinal Abdominal      Elevated LFTs     Patient had gastroenterology who felt that elevated LFTs were secondary to nonalcoholic fatty liver disease.  They recommended weight loss for the patient.  Patient has been exercising more and has been able to lose 12 pounds so far.  Patient will continue to work on diet and exercise.         Relevant Orders    Comprehensive Metabolic Panel     Diagnoses         Codes Comments    Essential hypertension    -  Primary ICD-10-CM: I10  ICD-9-CM: 401.9     Mixed hyperlipidemia     ICD-10-CM: E78.2  ICD-9-CM: 272.2     Elevated LFTs     ICD-10-CM: R79.89  ICD-9-CM: 790.6             Return for Next scheduled follow up.    Be Calderon MD   9/4/2024

## 2024-09-04 NOTE — ASSESSMENT & PLAN NOTE
Patient had gastroenterology who felt that elevated LFTs were secondary to nonalcoholic fatty liver disease.  They recommended weight loss for the patient.  Patient has been exercising more and has been able to lose 12 pounds so far.  Patient will continue to work on diet and exercise.

## 2025-06-30 ENCOUNTER — LAB (OUTPATIENT)
Dept: LAB | Facility: HOSPITAL | Age: 60
End: 2025-06-30
Payer: COMMERCIAL

## 2025-06-30 DIAGNOSIS — E78.2 MIXED HYPERLIPIDEMIA: ICD-10-CM

## 2025-06-30 DIAGNOSIS — R79.89 ELEVATED LFTS: ICD-10-CM

## 2025-06-30 DIAGNOSIS — I10 ESSENTIAL HYPERTENSION: ICD-10-CM

## 2025-06-30 PROCEDURE — 80053 COMPREHEN METABOLIC PANEL: CPT

## 2025-06-30 PROCEDURE — 80061 LIPID PANEL: CPT

## 2025-07-01 LAB
ALBUMIN SERPL-MCNC: 4.3 G/DL (ref 3.5–5.2)
ALBUMIN/GLOB SERPL: 1.4 G/DL
ALP SERPL-CCNC: 81 U/L (ref 39–117)
ALT SERPL W P-5'-P-CCNC: 45 U/L (ref 1–41)
ANION GAP SERPL CALCULATED.3IONS-SCNC: 13 MMOL/L (ref 5–15)
AST SERPL-CCNC: 45 U/L (ref 1–40)
BILIRUB SERPL-MCNC: 0.6 MG/DL (ref 0–1.2)
BUN SERPL-MCNC: 14 MG/DL (ref 6–20)
BUN/CREAT SERPL: 12.4 (ref 7–25)
CALCIUM SPEC-SCNC: 9.9 MG/DL (ref 8.6–10.5)
CHLORIDE SERPL-SCNC: 99 MMOL/L (ref 98–107)
CHOLEST SERPL-MCNC: 151 MG/DL (ref 0–200)
CO2 SERPL-SCNC: 25 MMOL/L (ref 22–29)
CREAT SERPL-MCNC: 1.13 MG/DL (ref 0.76–1.27)
EGFRCR SERPLBLD CKD-EPI 2021: 75.3 ML/MIN/1.73
GLOBULIN UR ELPH-MCNC: 3.1 GM/DL
GLUCOSE SERPL-MCNC: 118 MG/DL (ref 65–99)
HDLC SERPL-MCNC: 45 MG/DL (ref 40–60)
LDLC SERPL CALC-MCNC: 81 MG/DL (ref 0–100)
LDLC/HDLC SERPL: 1.72 {RATIO}
POTASSIUM SERPL-SCNC: 4.2 MMOL/L (ref 3.5–5.2)
PROT SERPL-MCNC: 7.4 G/DL (ref 6–8.5)
SODIUM SERPL-SCNC: 137 MMOL/L (ref 136–145)
TRIGL SERPL-MCNC: 144 MG/DL (ref 0–150)
VLDLC SERPL-MCNC: 25 MG/DL (ref 5–40)

## 2025-07-02 ENCOUNTER — LAB (OUTPATIENT)
Dept: LAB | Facility: HOSPITAL | Age: 60
End: 2025-07-02
Payer: COMMERCIAL

## 2025-07-02 ENCOUNTER — OFFICE VISIT (OUTPATIENT)
Dept: FAMILY MEDICINE CLINIC | Facility: CLINIC | Age: 60
End: 2025-07-02
Payer: COMMERCIAL

## 2025-07-02 VITALS
TEMPERATURE: 98.4 F | HEART RATE: 77 BPM | HEIGHT: 72 IN | SYSTOLIC BLOOD PRESSURE: 120 MMHG | BODY MASS INDEX: 35.05 KG/M2 | OXYGEN SATURATION: 97 % | WEIGHT: 258.8 LBS | DIASTOLIC BLOOD PRESSURE: 80 MMHG

## 2025-07-02 DIAGNOSIS — Z23 IMMUNIZATION DUE: ICD-10-CM

## 2025-07-02 DIAGNOSIS — Z12.5 SCREENING FOR PROSTATE CANCER: ICD-10-CM

## 2025-07-02 DIAGNOSIS — I10 ESSENTIAL HYPERTENSION: ICD-10-CM

## 2025-07-02 DIAGNOSIS — I25.10 CORONARY ARTERY DISEASE DUE TO LIPID RICH PLAQUE: ICD-10-CM

## 2025-07-02 DIAGNOSIS — E55.9 VITAMIN D DEFICIENCY: ICD-10-CM

## 2025-07-02 DIAGNOSIS — E66.09 CLASS 2 OBESITY DUE TO EXCESS CALORIES WITHOUT SERIOUS COMORBIDITY WITH BODY MASS INDEX (BMI) OF 35.0 TO 35.9 IN ADULT: ICD-10-CM

## 2025-07-02 DIAGNOSIS — Z00.00 WELL ADULT EXAM: Primary | ICD-10-CM

## 2025-07-02 DIAGNOSIS — I25.83 CORONARY ARTERY DISEASE DUE TO LIPID RICH PLAQUE: ICD-10-CM

## 2025-07-02 DIAGNOSIS — Z87.891 PERSONAL HISTORY OF TOBACCO USE, PRESENTING HAZARDS TO HEALTH: ICD-10-CM

## 2025-07-02 DIAGNOSIS — N40.1 BENIGN NON-NODULAR PROSTATIC HYPERPLASIA WITH LOWER URINARY TRACT SYMPTOMS: ICD-10-CM

## 2025-07-02 DIAGNOSIS — E78.2 MIXED HYPERLIPIDEMIA: ICD-10-CM

## 2025-07-02 DIAGNOSIS — I25.9 ISCHEMIC HEART DISEASE: ICD-10-CM

## 2025-07-02 DIAGNOSIS — R73.03 PREDIABETES: ICD-10-CM

## 2025-07-02 DIAGNOSIS — Z12.83 SKIN CANCER SCREENING: ICD-10-CM

## 2025-07-02 DIAGNOSIS — I25.10 CORONARY ARTERY DISEASE INVOLVING NATIVE CORONARY ARTERY OF NATIVE HEART WITHOUT ANGINA PECTORIS: ICD-10-CM

## 2025-07-02 DIAGNOSIS — E66.812 CLASS 2 OBESITY DUE TO EXCESS CALORIES WITHOUT SERIOUS COMORBIDITY WITH BODY MASS INDEX (BMI) OF 35.0 TO 35.9 IN ADULT: ICD-10-CM

## 2025-07-02 LAB
BASOPHILS # BLD AUTO: 0.09 10*3/MM3 (ref 0–0.2)
BASOPHILS NFR BLD AUTO: 1.2 % (ref 0–1.5)
DEPRECATED RDW RBC AUTO: 45.2 FL (ref 37–54)
EOSINOPHIL # BLD AUTO: 0.17 10*3/MM3 (ref 0–0.4)
EOSINOPHIL NFR BLD AUTO: 2.2 % (ref 0.3–6.2)
ERYTHROCYTE [DISTWIDTH] IN BLOOD BY AUTOMATED COUNT: 12.4 % (ref 12.3–15.4)
HBA1C MFR BLD: 5.7 % (ref 4.8–5.6)
HCT VFR BLD AUTO: 43.9 % (ref 37.5–51)
HGB BLD-MCNC: 14.5 G/DL (ref 13–17.7)
IMM GRANULOCYTES # BLD AUTO: 0.05 10*3/MM3 (ref 0–0.05)
IMM GRANULOCYTES NFR BLD AUTO: 0.7 % (ref 0–0.5)
LYMPHOCYTES # BLD AUTO: 1.75 10*3/MM3 (ref 0.7–3.1)
LYMPHOCYTES NFR BLD AUTO: 23.1 % (ref 19.6–45.3)
MCH RBC QN AUTO: 32.4 PG (ref 26.6–33)
MCHC RBC AUTO-ENTMCNC: 33 G/DL (ref 31.5–35.7)
MCV RBC AUTO: 98.2 FL (ref 79–97)
MONOCYTES # BLD AUTO: 0.88 10*3/MM3 (ref 0.1–0.9)
MONOCYTES NFR BLD AUTO: 11.6 % (ref 5–12)
NEUTROPHILS NFR BLD AUTO: 4.62 10*3/MM3 (ref 1.7–7)
NEUTROPHILS NFR BLD AUTO: 61.2 % (ref 42.7–76)
NRBC BLD AUTO-RTO: 0 /100 WBC (ref 0–0.2)
PLATELET # BLD AUTO: 159 10*3/MM3 (ref 140–450)
PMV BLD AUTO: 11.7 FL (ref 6–12)
RBC # BLD AUTO: 4.47 10*6/MM3 (ref 4.14–5.8)
WBC NRBC COR # BLD AUTO: 7.56 10*3/MM3 (ref 3.4–10.8)

## 2025-07-02 PROCEDURE — G0103 PSA SCREENING: HCPCS

## 2025-07-02 PROCEDURE — 83036 HEMOGLOBIN GLYCOSYLATED A1C: CPT

## 2025-07-02 PROCEDURE — 82306 VITAMIN D 25 HYDROXY: CPT

## 2025-07-02 PROCEDURE — 85025 COMPLETE CBC W/AUTO DIFF WBC: CPT

## 2025-07-02 RX ORDER — ROSUVASTATIN CALCIUM 40 MG/1
40 TABLET, COATED ORAL DAILY
Qty: 90 TABLET | Refills: 3 | Status: SHIPPED | OUTPATIENT
Start: 2025-07-02

## 2025-07-02 RX ORDER — ISOSORBIDE MONONITRATE 30 MG/1
30 TABLET, EXTENDED RELEASE ORAL DAILY
Qty: 90 TABLET | Refills: 3 | Status: SHIPPED | OUTPATIENT
Start: 2025-07-02

## 2025-07-02 RX ORDER — METOPROLOL TARTRATE 25 MG/1
25 TABLET, FILM COATED ORAL 2 TIMES DAILY
Qty: 180 TABLET | Refills: 3 | Status: SHIPPED | OUTPATIENT
Start: 2025-07-02 | End: 2025-07-02

## 2025-07-02 RX ORDER — METOPROLOL TARTRATE 25 MG/1
25 TABLET, FILM COATED ORAL 2 TIMES DAILY
Qty: 180 TABLET | Refills: 3 | Status: SHIPPED | OUTPATIENT
Start: 2025-07-02

## 2025-07-02 RX ORDER — EZETIMIBE 10 MG/1
10 TABLET ORAL DAILY
Qty: 90 TABLET | Refills: 3 | Status: SHIPPED | OUTPATIENT
Start: 2025-07-02

## 2025-07-02 RX ORDER — PHENOL 1.4 %
1 AEROSOL, SPRAY (ML) MUCOUS MEMBRANE DAILY
COMMUNITY
Start: 2024-07-01

## 2025-07-02 RX ORDER — EZETIMIBE 10 MG/1
10 TABLET ORAL DAILY
Qty: 90 TABLET | Refills: 3 | Status: SHIPPED | OUTPATIENT
Start: 2025-07-02 | End: 2025-07-02

## 2025-07-02 RX ORDER — ROSUVASTATIN CALCIUM 40 MG/1
40 TABLET, COATED ORAL DAILY
Qty: 90 TABLET | Refills: 3 | Status: SHIPPED | OUTPATIENT
Start: 2025-07-02 | End: 2025-07-02

## 2025-07-02 RX ORDER — AMLODIPINE BESYLATE 2.5 MG/1
2.5 TABLET ORAL DAILY
Qty: 90 TABLET | Refills: 3 | Status: SHIPPED | OUTPATIENT
Start: 2025-07-02 | End: 2025-07-02

## 2025-07-02 RX ORDER — CLOPIDOGREL BISULFATE 75 MG/1
75 TABLET ORAL DAILY
Qty: 90 TABLET | Refills: 3 | Status: SHIPPED | OUTPATIENT
Start: 2025-07-02

## 2025-07-02 RX ORDER — TADALAFIL 5 MG/1
5 TABLET ORAL DAILY
Qty: 90 TABLET | Refills: 10 | Status: SHIPPED | OUTPATIENT
Start: 2025-07-02

## 2025-07-02 RX ORDER — NITROGLYCERIN 0.4 MG/1
0.4 TABLET SUBLINGUAL
Qty: 25 TABLET | Refills: 2 | Status: SHIPPED | OUTPATIENT
Start: 2025-07-02

## 2025-07-02 RX ORDER — AMLODIPINE BESYLATE 2.5 MG/1
2.5 TABLET ORAL DAILY
Qty: 90 TABLET | Refills: 3 | Status: SHIPPED | OUTPATIENT
Start: 2025-07-02

## 2025-07-02 RX ORDER — NITROGLYCERIN 0.4 MG/1
0.4 TABLET SUBLINGUAL
Qty: 25 TABLET | Refills: 2 | Status: SHIPPED | OUTPATIENT
Start: 2025-07-02 | End: 2025-07-02

## 2025-07-02 RX ORDER — ISOSORBIDE MONONITRATE 30 MG/1
30 TABLET, EXTENDED RELEASE ORAL DAILY
Qty: 90 TABLET | Refills: 3 | Status: SHIPPED | OUTPATIENT
Start: 2025-07-02 | End: 2025-07-02

## 2025-07-02 RX ORDER — CLOPIDOGREL BISULFATE 75 MG/1
75 TABLET ORAL DAILY
Qty: 90 TABLET | Refills: 3 | Status: SHIPPED | OUTPATIENT
Start: 2025-07-02 | End: 2025-07-02

## 2025-07-02 NOTE — LETTER
[facility]  Vaccine Consent Form    Patient Name:  Robin Whitlock  Patient :  1965     Vaccine(s) Ordered    Pneumococcal Conjugate Vaccine 21-Valent All        Screening Checklist  The following questions should be completed prior to vaccination. If you answer “yes” to any question, it does not necessarily mean you should not be vaccinated. It just means we may need to clarify or ask more questions. If a question is unclear, please ask your healthcare provider to explain it.    Yes No   Any fever or moderate to severe illness today (mild illness and/or antibiotic treatment are not contraindications)?     Do you have a history of a serious reaction to any previous vaccinations, such as anaphylaxis, encephalopathy within 7 days, Guillain-Woodbine syndrome within 6 weeks, seizure?     Have you received any live vaccine(s) (e.g MMR, JOSE) or any other vaccines in the last month (to ensure duplicate doses aren't given)?     Do you have an anaphylactic allergy to latex (DTaP, DTaP-IPV, Hep A, Hep B, MenB, RV, Td, Tdap), baker’s yeast (Hep B, HPV), polysorbates (RSV, nirsevimab, PCV 20 and 21, Rotavirrus, Tdap, Shingrix), or gelatin (JOSE, MMR)?     Do you have an anaphylactic allergy to neomycin (Rabies, JOSE, MMR, IPV, Hep A), polymyxin B (IPV), or streptomycin (IPV)?      Any cancer, leukemia, AIDS, or other immune system disorder? (JOSE, MMR, RV)     Do you have a parent, brother, or sister with an immune system problem (if immune competence of vaccine recipient clinically verified, can proceed)? (MMR, JOSE)     Any recent steroid treatments for >2 weeks, chemotherapy, or radiation treatment? (JOSE, MMR)     Have you received antibody-containing blood transfusions or IVIG in the past 11 months (recommended interval is dependent on product)? (MMR, JOSE)     Have you taken antiviral drugs (acyclovir, famciclovir, valacyclovir for JOSE) in the last 24 or 48 hours, respectively?      Are you pregnant or planning  "to become pregnant within 1 month? (JOSE, MMR, HPV, IPV, MenB, Abrexvy; For Hep B- refer to Engerix-B; For RSV - Abrysvo is indicated for 32-36 weeks of pregnancy from September to January)     For infants, have you ever been told your child has had intussusception or a medical emergency involving obstruction of the intestine (Rotavirus)? If not for an infant, can skip this question.         *Ordering Physicians/APC should be consulted if \"yes\" is checked by the patient or guardian above.  I have received, read, and understand the Vaccine Information Statement (VIS) for each vaccine ordered.  I have considered my or my child's health status as well as the health status of my close contacts.  I have taken the opportunity to discuss my vaccine questions with my or my child's health care provider.   I have requested that the ordered vaccine(s) be given to me or my child.  I understand the benefits and risks of the vaccines.  I understand that I should remain in the clinic for 15 minutes after receiving the vaccine(s).  _________________________________________________________  Signature of Patient or Parent/Legal Guardian ____________________  Date   "

## 2025-07-02 NOTE — PATIENT INSTRUCTIONS
"Hypertension, Adult  High blood pressure (hypertension) is when the force of blood pumping through the arteries is too strong. The arteries are the blood vessels that carry blood from the heart throughout the body. Hypertension forces the heart to work harder to pump blood and may cause arteries to become narrow or stiff. Untreated or uncontrolled hypertension can lead to a heart attack, heart failure, a stroke, kidney disease, and other problems.  A blood pressure reading consists of a higher number over a lower number. Ideally, your blood pressure should be below 120/80. The first (\"top\") number is called the systolic pressure. It is a measure of the pressure in your arteries as your heart beats. The second (\"bottom\") number is called the diastolic pressure. It is a measure of the pressure in your arteries as the heart relaxes.  What are the causes?  The exact cause of this condition is not known. There are some conditions that result in high blood pressure.  What increases the risk?  Certain factors may make you more likely to develop high blood pressure. Some of these risk factors are under your control, including:  Smoking.  Not getting enough exercise or physical activity.  Being overweight.  Having too much fat, sugar, calories, or salt (sodium) in your diet.  Drinking too much alcohol.  Other risk factors include:  Having a personal history of heart disease, diabetes, high cholesterol, or kidney disease.  Stress.  Having a family history of high blood pressure and high cholesterol.  Having obstructive sleep apnea.  Age. The risk increases with age.  What are the signs or symptoms?  High blood pressure may not cause symptoms. Very high blood pressure (hypertensive crisis) may cause:  Headache.  Fast or irregular heartbeats (palpitations).  Shortness of breath.  Nosebleed.  Nausea and vomiting.  Vision changes.  Severe chest pain, dizziness, and seizures.  How is this diagnosed?  This condition is diagnosed by " measuring your blood pressure while you are seated, with your arm resting on a flat surface, your legs uncrossed, and your feet flat on the floor. The cuff of the blood pressure monitor will be placed directly against the skin of your upper arm at the level of your heart. Blood pressure should be measured at least twice using the same arm. Certain conditions can cause a difference in blood pressure between your right and left arms.  If you have a high blood pressure reading during one visit or you have normal blood pressure with other risk factors, you may be asked to:  Return on a different day to have your blood pressure checked again.  Monitor your blood pressure at home for 1 week or longer.  If you are diagnosed with hypertension, you may have other blood or imaging tests to help your health care provider understand your overall risk for other conditions.  How is this treated?  This condition is treated by making healthy lifestyle changes, such as eating healthy foods, exercising more, and reducing your alcohol intake. You may be referred for counseling on a healthy diet and physical activity.  Your health care provider may prescribe medicine if lifestyle changes are not enough to get your blood pressure under control and if:  Your systolic blood pressure is above 130.  Your diastolic blood pressure is above 80.  Your personal target blood pressure may vary depending on your medical conditions, your age, and other factors.  Follow these instructions at home:  Eating and drinking    Eat a diet that is high in fiber and potassium, and low in sodium, added sugar, and fat. An example of this eating plan is called the DASH diet. DASH stands for Dietary Approaches to Stop Hypertension. To eat this way:  Eat plenty of fresh fruits and vegetables. Try to fill one half of your plate at each meal with fruits and vegetables.  Eat whole grains, such as whole-wheat pasta, brown rice, or whole-grain bread. Fill about one  fourth of your plate with whole grains.  Eat or drink low-fat dairy products, such as skim milk or low-fat yogurt.  Avoid fatty cuts of meat, processed or cured meats, and poultry with skin. Fill about one fourth of your plate with lean proteins, such as fish, chicken without skin, beans, eggs, or tofu.  Avoid pre-made and processed foods. These tend to be higher in sodium, added sugar, and fat.  Reduce your daily sodium intake. Many people with hypertension should eat less than 1,500 mg of sodium a day.  Do not drink alcohol if:  Your health care provider tells you not to drink.  You are pregnant, may be pregnant, or are planning to become pregnant.  If you drink alcohol:  Limit how much you have to:  0-1 drink a day for women.  0-2 drinks a day for men.  Know how much alcohol is in your drink. In the [place] one drink equals one 12 oz bottle of beer (355 mL), one 5 oz glass of wine (148 mL), or one 1½ oz glass of hard liquor (44 mL).  Lifestyle    Work with your health care provider to maintain a healthy body weight or to lose weight. Ask what an ideal weight is for you.  Get at least 30 minutes of exercise that causes your heart to beat faster (aerobic exercise) most days of the week. Activities may include walking, swimming, or biking.  Include exercise to strengthen your muscles (resistance exercise), such as Pilates or lifting weights, as part of your weekly exercise routine. Try to do these types of exercises for 30 minutes at least 3 days a week.  Do not use any products that contain nicotine or tobacco. These products include cigarettes, chewing tobacco, and vaping devices, such as e-cigarettes. If you need help quitting, ask your health care provider.  Monitor your blood pressure at home as told by your health care provider.  Keep all follow-up visits. This is important.  Medicines  Take over-the-counter and prescription medicines only as told by your health care provider. Follow directions carefully. Blood  pressure medicines must be taken as prescribed.  Do not skip doses of blood pressure medicine. Doing this puts you at risk for problems and can make the medicine less effective.  Ask your health care provider about side effects or reactions to medicines that you should watch for.  Contact a health care provider if you:  Think you are having a reaction to a medicine you are taking.  Have headaches that keep coming back (recurring).  Feel dizzy.  Have swelling in your ankles.  Have trouble with your vision.  Get help right away if you:  Develop a severe headache or confusion.  Have unusual weakness or numbness.  Feel faint.  Have severe pain in your chest or abdomen.  Vomit repeatedly.  Have trouble breathing.  These symptoms may be an emergency. Get help right away. Call 911.  Do not wait to see if the symptoms will go away.  Do not drive yourself to the hospital.  Summary  Hypertension is when the force of blood pumping through your arteries is too strong. If this condition is not controlled, it may put you at risk for serious complications.  Your personal target blood pressure may vary depending on your medical conditions, your age, and other factors. For most people, a normal blood pressure is less than 120/80.  Hypertension is treated with lifestyle changes, medicines, or a combination of both. Lifestyle changes include losing weight, eating a healthy, low-sodium diet, exercising more, and limiting alcohol.  This information is not intended to replace advice given to you by your health care provider. Make sure you discuss any questions you have with your health care provider.  Document Revised: 10/25/2022 Document Reviewed: 10/25/2022  Elsevier Patient Education © 2024 [employer]BMI for Adults  Body mass index (BMI) is a number found using a person's weight and height. BMI can help tell how much of a person's weight is made up of fat. BMI does not measure body fat directly. It is used instead of tests that  "directly measure body fat, which can be difficult and expensive.  What are BMI measurements used for?  BMI is useful to:  Find out if your weight puts you at higher risk for medical problems.  Help recommend changes, such as in diet and exercise. This can help you reach a healthy weight. BMI screening can be done again to see if these changes are working.  How is BMI calculated?  Your height and weight are measured. The BMI is found from those numbers. This can be done with [place] or metric measurements. Note that charts and online BMI calculators are available to help you find your BMI quickly and easily without doing these calculations.  To calculate your BMI in [place] measurements:  Measure your weight in pounds (lb).  Multiply the number of pounds by 703.  So, for an adult who weighs 150 lb, multiply that number by 703: 150 x 703, which equals 105,450.  Measure your height in inches. Then multiply that number by itself to get a measurement called \"inches squared.\"  So, for an adult who is 70 inches tall, the \"inches squared\" measurement is 70 inches x 70 inches, which equals 4,900 inches squared.  Divide the total from step 2 (number of lb x 703) by the total from step 3 (inches squared): 105,450 ÷ 4,900 = 21.5. This is your BMI.  To calculate your BMI in metric measurements:    Measure your weight in kilograms (kg).  For this example, the weight is 70 kg.  Measure your height in meters (m). Then multiply that number by itself to get a measurement called \"meters squared.\"  So, for an adult who is 1.75 m tall, the \"meters squared\" measurement is 1.75 m x 1.75 m, which equals 3.1 meters squared.  Divide the number of kilograms (your weight) by the meters squared number. In this example: 70 ÷ 3.1 = 22.6. This is your BMI.  What do the results mean?  BMI charts are used to see if you are underweight, normal weight, overweight, or obese. The following guidelines will be used:  Underweight: BMI less than 18.5.  Normal " weight: BMI between 18.5 and 24.9.  Overweight: BMI between 25 and 29.9.  Obese: BMI of 30 or above.  BMI is a tool and cannot diagnose a condition. Talk with your health care provider about what your BMI means for you. Keep these notes in mind:  Weight includes fat and muscle. Someone with a muscular build, such as an athlete, may have a BMI that is higher than 24.9. In cases like these, BMI is not a correct measure of body fat.  If you have a BMI of 25 or higher, your provider may need to do more testing to find out if excess body fat is the cause.  BMI is measured the same way for males and females. Females usually have more body fat than males of the same height and weight.  Where to find more information  For more information about BMI, including tools to quickly find your BMI, go to:  Centers for Disease Control and Prevention: cdc.gov  American Heart Association: heart.org  National Heart, Lung, and Blood [place]: nhlbi.nih.gov  This information is not intended to replace advice given to you by your health care provider. Make sure you discuss any questions you have with your health care provider.  Document Revised: 09/07/2023 Document Reviewed: 08/31/2023  Elsevier Patient Education © 2024 [email or web address]Please review the decision aid used during our discussion regarding the Low dose lung cancer screening visit today.                           Pediatric
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
## 2025-07-03 LAB
25(OH)D3 SERPL-MCNC: 39.8 NG/ML (ref 30–100)
PSA SERPL-MCNC: 0.49 NG/ML (ref 0–4)

## 2025-07-25 ENCOUNTER — HOSPITAL ENCOUNTER (OUTPATIENT)
Dept: CT IMAGING | Facility: HOSPITAL | Age: 60
Discharge: HOME | End: 2025-07-25
Admitting: FAMILY MEDICINE
Payer: COMMERCIAL

## 2025-07-25 DIAGNOSIS — Z87.891 PERSONAL HISTORY OF TOBACCO USE, PRESENTING HAZARDS TO HEALTH: ICD-10-CM

## 2025-07-25 PROCEDURE — 71271 CT THORAX LUNG CANCER SCR C-: CPT

## 2025-08-06 DIAGNOSIS — I25.10 CORONARY ARTERY DISEASE DUE TO LIPID RICH PLAQUE: ICD-10-CM

## 2025-08-06 DIAGNOSIS — I25.83 CORONARY ARTERY DISEASE DUE TO LIPID RICH PLAQUE: ICD-10-CM

## 2025-08-07 RX ORDER — ROSUVASTATIN CALCIUM 40 MG/1
40 TABLET, COATED ORAL DAILY
Qty: 90 TABLET | Refills: 3 | Status: SHIPPED | OUTPATIENT
Start: 2025-08-07

## 2025-08-07 RX ORDER — METOPROLOL TARTRATE 25 MG/1
25 TABLET, FILM COATED ORAL 2 TIMES DAILY
Qty: 180 TABLET | Refills: 3 | Status: SHIPPED | OUTPATIENT
Start: 2025-08-07

## 2025-08-07 RX ORDER — ISOSORBIDE MONONITRATE 30 MG/1
30 TABLET, EXTENDED RELEASE ORAL DAILY
Qty: 90 TABLET | Refills: 3 | Status: SHIPPED | OUTPATIENT
Start: 2025-08-07

## 2025-08-07 RX ORDER — EZETIMIBE 10 MG/1
10 TABLET ORAL DAILY
Qty: 90 TABLET | Refills: 3 | Status: SHIPPED | OUTPATIENT
Start: 2025-08-07

## 2025-08-07 RX ORDER — AMLODIPINE BESYLATE 2.5 MG/1
2.5 TABLET ORAL DAILY
Qty: 90 TABLET | Refills: 3 | Status: SHIPPED | OUTPATIENT
Start: 2025-08-07